# Patient Record
Sex: FEMALE | Race: WHITE | NOT HISPANIC OR LATINO | Employment: UNEMPLOYED | URBAN - METROPOLITAN AREA
[De-identification: names, ages, dates, MRNs, and addresses within clinical notes are randomized per-mention and may not be internally consistent; named-entity substitution may affect disease eponyms.]

---

## 2017-03-27 ENCOUNTER — TRANSFERRED RECORDS (OUTPATIENT)
Dept: HEALTH INFORMATION MANAGEMENT | Facility: CLINIC | Age: 37
End: 2017-03-27

## 2018-09-06 ENCOUNTER — HOSPITAL ENCOUNTER (OUTPATIENT)
Dept: PHYSICAL THERAPY | Facility: CLINIC | Age: 38
Setting detail: THERAPIES SERIES
End: 2018-09-06
Payer: COMMERCIAL

## 2018-09-06 PROCEDURE — 97110 THERAPEUTIC EXERCISES: CPT | Mod: GP | Performed by: PHYSICAL THERAPIST

## 2018-09-06 PROCEDURE — 97140 MANUAL THERAPY 1/> REGIONS: CPT | Mod: GP | Performed by: PHYSICAL THERAPIST

## 2018-09-06 PROCEDURE — 97161 PT EVAL LOW COMPLEX 20 MIN: CPT | Mod: GP | Performed by: PHYSICAL THERAPIST

## 2018-09-06 PROCEDURE — 40000099 ZZH STATISTIC LYMPHEDEMA VISIT: Performed by: PHYSICAL THERAPIST

## 2018-09-06 NOTE — PROGRESS NOTES
"Outpatient Lymphedema Therapy Evaluation     09/06/18 0900   Rehab Discipline   Discipline PT   Type of Visit   Type of visit Initial Edema Evaluation       present No   General Information   Start of care 09/06/18   Referring physician Dr. Vi Bennett MD ([facility])   Orders and Order date Evaluate and treat as indicated (order date : 8/23/18)     Medical diagnosis s/p R breast mastectomy 12/13/12; s/p gastric bypass 8/10/15   Onset of illness / date of surgery 08/10/15   Edema onset 01/20/13   Affected body parts Trunk;RUE   Edema etiology Cancer with lymph node dissection;Radiation;Chemo;Surgery;Infection   Location - Cancer with lymph node dissection 10 LN removed R axillary, paul mastectomy L preventative, 2 + LN R   Location - Radiation completed 3/29/13 R breast axilla down to ribs   Radiation comments completed 4/2013   Chemotherapy comments completed 4/2013   Edema etiology comments recent fat grafting performed in the R-reconstructed breast ~8 months ago; recent palpable lump at R-breast; had US on 7/26/18 - per pt report lump is not cancerous and is \"fat necrosis\" from grafting and reports will go back in October to [place] for either draining or removal   Pertinent history of current problem (PT: include personal factors and/or comorbidities that impact the POC; OT: include additional occupational profile info) last seen at this OP lymphedema clinic from 12/17/15 - 2/16/16 and was using a 20-30mmHg compression sleeve from [facility] (over the counter) at discharge, also has a Flexitouch compression pump; 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; Flexitouch pump as needed   Surgical / medical history reviewed Yes   Edema special tests Ultrasound  (on 7/26/18 for palpable lump at R-breast)   Prior level of functional mobility independent with funcitonal mobility and ADL " with tightness noted in R-axilla with far end ROM (flexion primarily)   Prior treatment Complete decongestive therapy;Compression garments;Exercise;Compression pump;Elevation;MLD;Gradient compression bandaging  (also has Tribute for nighttime)   Patient role / employment history Homemaker   [facility]   Living environment comments  and two boys   Assistive device comments none   Fall Risk Screen   Fall screen completed by PT   Have you fallen 2 or more times in the past year? No   Have you fallen and had an injury in the past year? No   Is patient a fall risk? No   System Outcome Measures   Outcome Measures Lymphedema   Lymphedema Life Impact Scale (score range 0-72). A higher score indicates greater impairment. 10   Subjective Report   Patient report of symptoms arm is heavy; R-breast can get tight along incision line and where fat necrosis pad is and tightness felt with end range overhead activities   Precautions   Precautions comments no new precautions   Patient / Family Goals   Patient / family goals statement to control and maintain edema   Pain   Patient currently in pain No   Vitals Signs   Heart Rate 89   SpO2 98   Cognitive Status   Orientation Orientation to person, place and time   Level of consciousness Alert   Follows commands and answers questions 100% of the time   Personal safety and judgement Intact   Memory Intact   Edema Exam / Assessment   Skin condition Intact   Skin condition comments RUE skin all intact, R-upper arm is barrera/thicker upon palpation vs L-upper arm; R-axilla with noteable pucking and tightness but no palpable cord present   Scar Yes   Location B breast mastectomy scars with noteable small edematous filled buldge mid-anteiror breast from fat necrosis   Mobility fairly good   Scar comments closed/healed   Capillary refill Symmetrical   Radial pulse Symmetrical   Stemmer sign Negative   Ulceration No   Girth Measurements   Girth Measurements Refer  to separate girth measurement flowsheet   Volume UE   Right UE (mL) 2484.82  (+28.7% since last seen 2/16/16 (pt with 50# wt gain))   Left UE (mL) 2203.71  (+19.6% since last seen 2/16/16 (pt with 50# wt gain))   UE volume comparison RUE volume greater than LUE volume   % difference 11.0%   Range of Motion   ROM comments RUE GH flex, GH abduction and IR/ER WFL; reported tightneww at end range with visible pucking in R-axilla with extreme R-GH flex   Strength   Strength comments functional RUE strength   Posture   Posture Normal   Palpation   Palpation denies hypersensitivities   Sensory   Sensory perception Light touch   Light touch Impaired   Sensory perception comments absent to decreased sensation in R-axilla and R-breast   Vascular Assessment   Vascular Assessment Comments no known concerns   Coordination   Coordination Gross motor coordination appropriate   Muscle Tone   Muscle tone No deficits were identified   Planned Edema Interventions   Planned edema interventions Manual lymph drainage;Gradient compression bandaging;Fit for compression garment;Exercises;Precautions to prevent infection / exacerbation;Education;Manual therapy;Skin care / precautions;Myofascial release;Home management program development   Clinical Impression   Criteria for skilled therapeutic intervention met Yes   Therapy diagnosis RUE lymphedema and R-axillary tightness   Influenced by the following impairments / conditions Stage 2   Functional limitations due to impairments / conditions 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; occasional discomfort when sleeping   Clinical Presentation Evolving/Changing   Clinical Presentation Rationale clinical judgement; RUE with 28.7% increase over past 3 years; new/increased tightness in R-axilla   Clinical Decision Making (Complexity) Low complexity   Treatment frequency 1 time / week   Treatment duration 12 weeks   Patient / family and/or staff in agreement with plan of care Yes   Risks and  benefits of therapy have been explained Yes   Education Assessment   Preferred learning style Listening   Barriers to learning No barriers   Goals   Edema Eval Goals 1;2;3   Goal 1   Goal identifier stg   Goal description pt to have nighttime tolerance to RUE GCB for edema reduction response   Target date 09/20/18   Goal 2   Goal identifier ltg   Goal description once appropriate ,pt to be independent with donning, doffing and care of compression sleeve for longterm RUE lymphedema management for maintenance   Target date 12/05/18   Goal 3   Goal identifier ltg   Goal description pt to be independent with RUE lymphedema management longterm via stretching, skin cares, compression garment wear and compresion pump use   Target date 12/05/18   Goal 4   Goal identifier ltg   Goal description pt to have at least 2 point improvement on LLIS due to decreased lymphedema and associated symptoms in RUE   Target date 12/05/18   Total Evaluation Time   Total evaluation time 10

## 2018-09-13 ENCOUNTER — HOSPITAL ENCOUNTER (OUTPATIENT)
Dept: PHYSICAL THERAPY | Facility: CLINIC | Age: 38
Setting detail: THERAPIES SERIES
End: 2018-09-13
Attending: FAMILY MEDICINE
Payer: COMMERCIAL

## 2018-09-13 PROCEDURE — 97140 MANUAL THERAPY 1/> REGIONS: CPT | Mod: GP | Performed by: PHYSICAL THERAPIST

## 2018-09-13 PROCEDURE — 40000099 ZZH STATISTIC LYMPHEDEMA VISIT: Performed by: PHYSICAL THERAPIST

## 2018-09-20 ENCOUNTER — HOSPITAL ENCOUNTER (OUTPATIENT)
Dept: PHYSICAL THERAPY | Facility: CLINIC | Age: 38
Setting detail: THERAPIES SERIES
End: 2018-09-20
Payer: COMMERCIAL

## 2018-09-20 PROCEDURE — 40000099 ZZH STATISTIC LYMPHEDEMA VISIT: Performed by: REHABILITATION PRACTITIONER

## 2018-09-20 PROCEDURE — 97140 MANUAL THERAPY 1/> REGIONS: CPT | Mod: GP | Performed by: REHABILITATION PRACTITIONER

## 2018-09-27 ENCOUNTER — HOSPITAL ENCOUNTER (OUTPATIENT)
Dept: PHYSICAL THERAPY | Facility: CLINIC | Age: 38
Setting detail: THERAPIES SERIES
End: 2018-09-27
Payer: COMMERCIAL

## 2018-09-27 PROCEDURE — 40000099 ZZH STATISTIC LYMPHEDEMA VISIT: Performed by: REHABILITATION PRACTITIONER

## 2018-09-27 PROCEDURE — 97140 MANUAL THERAPY 1/> REGIONS: CPT | Mod: GP | Performed by: REHABILITATION PRACTITIONER

## 2018-10-10 NOTE — PROGRESS NOTES
Outpatient Physical Therapy Progress Note     Patient: Princess Chang  : 1980    Beginning/End Dates of Reporting Period:  2018 to 10/6/2018    Referring Provider: Dr. Vi Bennett MD    Therapy Diagnosis: RUE lymphedema with chest and axillary tightness     Client Self Report: feels the stretch is really helping, wearing compression sleeve daily    Objective Measurements:  Objective Measure: girth  Details: R UE -3.9%       Outcome Measures (most recent score):   LLIS  = 10 on date of initial evaluation; pt will again complete LLIS at time of discharge     Goals:  Goal Identifier stg   Goal Description pt to have nighttime tolerance to RUE GCB for edema reduction response   Target Date 18   Date Met  18   Progress:     Goal Identifier ltg   Goal Description once appropriate ,pt to be independent with donning, doffing and care of compression sleeve for longterm RUE lymphedema management for maintenance   Target Date 18   Date Met      Progress:     Goal Identifier ltg   Goal Description pt to be independent with RUE lymphedema management longterm via stretching, skin cares, compression garment wear and compresion pump use   Target Date 18   Date Met      Progress:     Goal Identifier ltg   Goal Description pt to have at least 2 point improvement on LLIS due to decreased lymphedema and associated symptoms in RUE   Target Date 18   Date Met      Progress:       Progress Toward Goals:   Patient is making good progress toward goals in regards to LUE lymphedema girth/volume reductions as well as reported decreased tightness; will continue with weekly treatments until LUE girth plateaus and then will get patient fitted for a new compression sleeve for maintenance for longterm management      Plan:  Continue therapy per current plan of care.    Discharge:  No

## 2018-10-10 NOTE — ADDENDUM NOTE
Encounter addended by: Roseline Gtz, PT on: 10/10/2018  9:47 AM<BR>     Actions taken: Flowsheet accepted, Sign clinical note

## 2018-10-16 ENCOUNTER — HOSPITAL ENCOUNTER (OUTPATIENT)
Dept: PHYSICAL THERAPY | Facility: CLINIC | Age: 38
Setting detail: THERAPIES SERIES
End: 2018-10-16
Payer: COMMERCIAL

## 2018-10-16 PROCEDURE — 97140 MANUAL THERAPY 1/> REGIONS: CPT | Mod: GP | Performed by: PHYSICAL THERAPIST

## 2018-10-16 PROCEDURE — 40000099 ZZH STATISTIC LYMPHEDEMA VISIT: Performed by: PHYSICAL THERAPIST

## 2018-11-07 NOTE — ADDENDUM NOTE
Encounter addended by: Roseline Gtz, PT on: 11/7/2018  9:38 AM<BR>     Actions taken: Sign clinical note, Flowsheet accepted

## 2018-11-07 NOTE — PROGRESS NOTES
Outpatient Physical Therapy Progress Note     Patient: Princess Chang  : 1980    Beginning/End Dates of Reporting Period:  10/6/2018 to 2018    Referring Provider: Dr. Vi Bennett MD    Therapy Diagnosis: RUE lymphedema and breast lymphedema, chronic ; with related scarring/tightening     Client Self Report: all the biopsies I had came back beneign     Objective Measurements:  Objective Measure: girth  Details: RUE -8.2% since last session on 18      Outcome Measures (most recent score):   LLIS = 10 on date of initial evaluation; will again complete at time of discharge     Goals:  Goal Identifier stg   Goal Description pt to have nighttime tolerance to RUE GCB for edema reduction response   Target Date 18   Date Met  18   Progress:     Goal Identifier ltg   Goal Description once appropriate ,pt to be independent with donning, doffing and care of compression sleeve for longterm RUE lymphedema management for maintenance   Target Date 18   Date Met      Progress:     Goal Identifier ltg   Goal Description pt to be independent with RUE lymphedema management longterm via stretching, skin cares, compression garment wear and compresion pump use   Target Date 18   Date Met      Progress:     Goal Identifier ltg   Goal Description pt to have at least 2 point improvement on LLIS due to decreased lymphedema and associated symptoms in RUE   Target Date 18   Date Met      Progress:       Progress Toward Goals:   Good reductions in RUE girth/volume with CDT. Will continue with CDT until plateau achieved and then fit pt for a new compression sleeve, possibly custom pending measurements, for longterm management for maintenance. Pt reports also needing a new nighttime Tribute.       Plan:  Continue therapy per current plan of care.    Discharge:  No

## 2018-11-26 ENCOUNTER — COMMUNICATION - HEALTHEAST (OUTPATIENT)
Dept: SURGERY | Facility: CLINIC | Age: 38
End: 2018-11-26

## 2018-11-28 ENCOUNTER — HOSPITAL ENCOUNTER (OUTPATIENT)
Dept: PHYSICAL THERAPY | Facility: CLINIC | Age: 38
Setting detail: THERAPIES SERIES
End: 2018-11-28
Payer: COMMERCIAL

## 2018-11-28 DIAGNOSIS — I89.0 LYMPHEDEMA OF RIGHT UPPER EXTREMITY: ICD-10-CM

## 2018-11-28 DIAGNOSIS — I89.0 LYMPHEDEMA: Primary | ICD-10-CM

## 2018-11-28 PROCEDURE — 97140 MANUAL THERAPY 1/> REGIONS: CPT | Mod: GP | Performed by: PHYSICAL THERAPIST

## 2018-11-28 PROCEDURE — 40000099 ZZH STATISTIC LYMPHEDEMA VISIT: Performed by: PHYSICAL THERAPIST

## 2018-12-13 NOTE — ADDENDUM NOTE
Encounter addended by: Roseline Gtz, PT on: 12/13/2018 10:57 AM   Actions taken: Sign clinical note, Flowsheet accepted

## 2018-12-13 NOTE — PROGRESS NOTES
Outpatient Physical Therapy Progress Note     Patient: Princess Chang  : 1980    Beginning/End Dates of Reporting Period:  2018 to 2018    Referring Provider: Dr. Vi Bennett    Therapy Diagnosis: RUE and R-chest lymphedema      Client Self Report: my pump isn't fitting right, I need to know what to do to get it fixed    Objective Measurements:  Objective Measure: girth  Details: RUE +3.1% since 10/16/18 but -2.0% since initial evaluation      Outcome Measures (most recent score):   LLIS = 10 on date of initial evaluation; pt will again complete LLIS at time of discharge     Goals:  Goal Identifier stg   Goal Description pt to have nighttime tolerance to RUE GCB for edema reduction response   Target Date 18   Date Met  18   Progress:     Goal Identifier ltg   Goal Description once appropriate ,pt to be independent with donning, doffing and care of compression sleeve for longterm RUE lymphedema management for maintenance   Target Date 18   Date Met      Progress:     Goal Identifier ltg   Goal Description pt to be independent with RUE lymphedema management longterm via stretching, skin cares, compression garment wear and compresion pump use   Target Date 18   Date Met      Progress:     Goal Identifier ltg   Goal Description pt to have at least 2 point improvement on LLIS due to decreased lymphedema and associated symptoms in RUE   Target Date 18   Date Met      Progress:     Progress Toward Goals:   Good progress has been made toward goals. Arm has reduced and pt has no complaints (S&S of lymphedema) or deficits of RUE and was given 'go ahead' to go back to [facility] to get re-fit for custom compression sleeve and a new nighttime garment. Pt also has made appt with [facility] to get her Flexitouch pump re-fit. Pt instructed to return to lymphedema clinic after receiving her new garments and wearing for ~1-2 weeks to ensure new garments and home program are adequately  containing lymphedema and maintenance.  Anticipate next visit to be discharge.    Plan:  Continue therapy per current plan of care.    Discharge:  No

## 2019-01-03 NOTE — ADDENDUM NOTE
Encounter addended by: Roseline Gtz, PT on: 1/3/2019 2:23 PM   Actions taken: Sign clinical note, Flowsheet accepted

## 2019-01-03 NOTE — PROGRESS NOTES
Outpatient Physical Therapy Progress Note     Patient: Princess Chang  : 1980    Beginning/End Dates of Reporting Period:  2018 to 1/3/2019    Referring Provider: Dr. Dalton MD    Therapy Diagnosis: RUE lymphedema      Client Self Report: my pump isn't fitting right, I need to know what to do to get it fixed    Objective Measurements:  Objective Measure: girth  Details: RUE +3.1% since 10/16/18; since initial evaluation RUE -2.0%     Outcome Measures (most recent score):   LLIS = 10 on date of initial evaluation; pt will again complete LLIS at time of discharge     Goals:  Goal Identifier stg   Goal Description pt to have nighttime tolerance to RUE GCB for edema reduction response   Target Date 18   Date Met  18   Progress:     Goal Identifier ltg   Goal Description once appropriate ,pt to be independent with donning, doffing and care of compression sleeve for longterm RUE lymphedema management for maintenance   Target Date 19   Date Met      Progress:     Goal Identifier ltg   Goal Description pt to be independent with RUE lymphedema management longterm via stretching, skin cares, compression garment wear and compresion pump use   Target Date 19   Date Met      Progress:     Goal Identifier ltg   Goal Description pt to have at least 2 point improvement on LLIS due to decreased lymphedema and associated symptoms in RUE   Target Date 19   Date Met      Progress:     Progress Toward Goals:   Good progress made toward goals. Patient was last seen on 18 and at that time was instructed to return to [facility] to get fit for new daytime custom compression sleeve and nighttime garments as well as call [facility] to get new piece for compression pump and then instructed to return to OP lymphedema clinic after all three completed and has been wearing for 1-2 weeks to ensure that new garments and pump are adequately containing lymphedema for longterm management  for maintenance.  Spoke with patient on 1/3/19 and pt reports is still waiting for daytime sleeve as well as waiting to get new piece for pump - pt reports she's doing well at this time and will return sooner if needed.      Plan:  Continue therapy per current plan of care.    Discharge:  No

## 2019-02-05 NOTE — ADDENDUM NOTE
Encounter addended by: Roseline Gtz, PT on: 2/5/2019 1:00 PM   Actions taken: Sign clinical note, Flowsheet accepted

## 2019-02-05 NOTE — PROGRESS NOTES
Outpatient Physical Therapy Progress Note     Patient: Princess Chang  : 1980    Beginning/End Dates of Reporting Period:  1/3/2019 to 19    Referring Provider: Dr. Dalton MD    Therapy Diagnosis: RUE lymphedema      Client Self Report: my pump isn't fitting right, I need to know what to do to get it fixed    Objective Measurements:  Objective Measure: girth  Details: RUE +3.1% since 10/16/18; since initial evaluation RUE -2.0%     Outcome Measures (most recent score):   LLIS = 10 on date of initial evaluation; pt will again complete LLIS at time of discharge     Goals:  Goal Identifier stg   Goal Description pt to have nighttime tolerance to RUE GCB for edema reduction response   Target Date 18   Date Met  18   Progress:     Goal Identifier ltg   Goal Description once appropriate ,pt to be independent with donning, doffing and care of compression sleeve for longterm RUE lymphedema management for maintenance   Target Date 19   Date Met      Progress:     Goal Identifier ltg   Goal Description pt to be independent with RUE lymphedema management longterm via stretching, skin cares, compression garment wear and compresion pump use   Target Date 19   Date Met      Progress:     Goal Identifier ltg   Goal Description pt to have at least 2 point improvement on LLIS due to decreased lymphedema and associated symptoms in RUE   Target Date 19   Date Met      Progress:     Progress Toward Goals:   Patient was last seen on 18 and at that time was instructed to return to [facility] to get fit for new daytime custom compression sleeve and nighttime garments as well as call [facility] to get new piece for compression pump and then instructed to return to OP lymphedema clinic after all three completed and has been wearing for 1-2 weeks to ensure that new garments and pump are adequately containing lymphedema for longterm management for maintenance.  Spoke with patient  on 1/3/19 and pt reports is still waiting for daytime sleeve as well as waiting to get new piece for pump - pt reports she's doing well at this time and will return sooner if needed.    As of 2/2/19: patient still waiting for sleeve      Plan:  Continue therapy per current plan of care.    Discharge:  No

## 2019-02-12 ENCOUNTER — HOSPITAL ENCOUNTER (OUTPATIENT)
Dept: PHYSICAL THERAPY | Facility: CLINIC | Age: 39
Setting detail: THERAPIES SERIES
End: 2019-02-12
Payer: COMMERCIAL

## 2019-02-12 PROCEDURE — 97140 MANUAL THERAPY 1/> REGIONS: CPT | Mod: GP | Performed by: PHYSICAL THERAPIST

## 2019-02-13 ENCOUNTER — AMBULATORY - HEALTHEAST (OUTPATIENT)
Dept: SURGERY | Facility: CLINIC | Age: 39
End: 2019-02-13

## 2019-02-13 DIAGNOSIS — K91.2 POSTSURGICAL MALABSORPTION: ICD-10-CM

## 2019-02-13 DIAGNOSIS — Z98.84 S/P BARIATRIC SURGERY: ICD-10-CM

## 2019-02-13 DIAGNOSIS — K90.9 INTESTINAL MALABSORPTION, UNSPECIFIED: ICD-10-CM

## 2019-03-05 ENCOUNTER — OFFICE VISIT - HEALTHEAST (OUTPATIENT)
Dept: SURGERY | Facility: CLINIC | Age: 39
End: 2019-03-05

## 2019-03-05 DIAGNOSIS — E66.01 MORBID OBESITY (H): ICD-10-CM

## 2019-03-05 DIAGNOSIS — E55.9 VITAMIN D DEFICIENCY: ICD-10-CM

## 2019-03-05 DIAGNOSIS — E21.1 HYPERPARATHYROIDISM , SECONDARY, NON-RENAL (H): ICD-10-CM

## 2019-03-05 DIAGNOSIS — K91.2 POSTOPERATIVE MALABSORPTION: ICD-10-CM

## 2019-03-05 ASSESSMENT — MIFFLIN-ST. JEOR: SCORE: 1865.44

## 2019-03-07 ENCOUNTER — HOSPITAL ENCOUNTER (OUTPATIENT)
Dept: PHYSICAL THERAPY | Facility: CLINIC | Age: 39
Setting detail: THERAPIES SERIES
End: 2019-03-07
Payer: COMMERCIAL

## 2019-03-07 PROCEDURE — 97140 MANUAL THERAPY 1/> REGIONS: CPT | Mod: GP | Performed by: PHYSICAL THERAPIST

## 2019-03-14 NOTE — ADDENDUM NOTE
Encounter addended by: Roseline Gtz, PT on: 3/14/2019 8:57 AM   Actions taken: Flowsheet accepted, Sign clinical note

## 2019-03-14 NOTE — PROGRESS NOTES
Outpatient Physical Therapy Progress Note     Patient: Princess Chang  : 1980    Beginning/End Dates of Reporting Period:   2019 to 3/4/2019    Referring Provider: Dr. Vi Bennett MD    Therapy Diagnosis: RUE and R-chest/breast lymphedema, chronic      Client Self Report: I'm going to be getting more fat put into the R-breast in April    Objective Measurements:  Objective Measure: girth  Details: RUE -2.9% since last session on 19     Outcome Measures (most recent score):   LLIS = 10 on date of initial evaluation; pt will again complete LLIS at time of discharge     Goals:  Goal Identifier stg   Goal Description pt to have nighttime tolerance to RUE GCB for edema reduction response   Target Date 18   Date Met  18   Progress:     Goal Identifier ltg   Goal Description once appropriate ,pt to be independent with donning, doffing and care of compression sleeve for longterm RUE lymphedema management for maintenance   Target Date 19   Date Met  19   Progress:     Goal Identifier ltg   Goal Description pt to be independent with RUE lymphedema management longterm via stretching, skin cares, compression garment wear and compresion pump use   Target Date 19   Date Met      Progress:     Goal Identifier ltg   Goal Description pt to have at least 2 point improvement on LLIS due to decreased lymphedema and associated symptoms in RUE   Target Date 19   Date Met      Progress:     Progress Toward Goals:   Patient has made good progress toward goals with RUE lymphedema reduction response with adjusting her daytime and nighttime home program.  Pt will be getting additional fat placement in R-breast ~mid-April and instructed to return for check-in with lymphedema clinic just before and then again after that surgery/procedure, always sooner if needed.       Plan:  Continue therapy per current plan of care.    Discharge:  No

## 2019-04-03 NOTE — PROGRESS NOTES
Outpatient Physical Therapy Progress Note     Patient: Princess Chang  : 1980    Beginning/End Dates of Reporting Period:  3/4/2019 to 4/3/2019    Referring Provider: Dr. Vi Bennett MD    Therapy Diagnosis: RUE lymphedema      Client Self Report: I'm going to be getting more fat put into the R-breast in April by my plastic surgeon    Objective Measurements:  Objective Measure: girth  Details: RUE -2.9% since last session on 19      Outcome Measures (most recent score):   LLIS = 10 on date of initial evaluation; pt will again complete LLIS at time of discharge     Goals:  Goal Identifier stg   Goal Description pt to have nighttime tolerance to RUE GCB for edema reduction response   Target Date 18   Date Met  18   Progress:     Goal Identifier ltg   Goal Description once appropriate ,pt to be independent with donning, doffing and care of compression sleeve for longterm RUE lymphedema management for maintenance   Target Date 19   Date Met  19   Progress:     Goal Identifier ltg   Goal Description pt to be independent with RUE lymphedema management longterm via stretching, skin cares, compression garment wear and compresion pump use   Target Date 19   Date Met      Progress:     Goal Identifier ltg   Goal Description pt to have at least 2 point improvement on LLIS due to decreased lymphedema and associated symptoms in RUE   Target Date 19   Date Met      Progress:       Progress Toward Goals:   Patient last seen on 3/7/19 and at that time had made good progress in regards to RUE lymphedema reductions with modifying her home program.  Pt last seen on 3/7/19 and instructed pt to return in ~3 weeks to see if additional reductions can be achieved prior to surgery.    Plan:  Continue therapy per current plan of care.    Discharge:  No

## 2019-04-03 NOTE — ADDENDUM NOTE
Encounter addended by: Roseline Gtz, PT on: 4/3/2019 12:56 PM   Actions taken: Flowsheet accepted, Sign clinical note

## 2019-05-09 ENCOUNTER — HOSPITAL ENCOUNTER (OUTPATIENT)
Dept: PHYSICAL THERAPY | Facility: CLINIC | Age: 39
Setting detail: THERAPIES SERIES
End: 2019-05-09
Payer: COMMERCIAL

## 2019-05-09 PROCEDURE — 97140 MANUAL THERAPY 1/> REGIONS: CPT | Mod: GP | Performed by: PHYSICAL THERAPIST

## 2019-05-09 NOTE — ADDENDUM NOTE
Encounter addended by: Roseline Gtz, PT on: 5/9/2019 8:40 AM   Actions taken: Sign clinical note, Flowsheet accepted

## 2019-05-09 NOTE — PROGRESS NOTES
Outpatient Physical Therapy Progress Note     Patient: Princess Chang  : 1980    Beginning/End Dates of Reporting Period:  4/3/2019 - 5/3/19    Referring Provider: Dr. Vi Bennett MD    Therapy Diagnosis: RUE lymphedema      Client Self Report: I'm going to be getting more fat put into the R-breast in April by my plastic surgeon    Objective Measurements:  Objective Measure: girth  Details: RUE -2.9% since last session on 19      Outcome Measures (most recent score):   LLIS = 10 on date of initial evaluation; pt will again complete LLIS at time of discharge     Goals:  Goal Identifier stg   Goal Description pt to have nighttime tolerance to RUE GCB for edema reduction response   Target Date 18   Date Met  18   Progress:     Goal Identifier ltg   Goal Description once appropriate ,pt to be independent with donning, doffing and care of compression sleeve for longterm RUE lymphedema management for maintenance   Target Date 19   Date Met  19   Progress:     Goal Identifier ltg   Goal Description pt to be independent with RUE lymphedema management longterm via stretching, skin cares, compression garment wear and compresion pump use   Target Date 19   Date Met      Progress:     Goal Identifier ltg   Goal Description pt to have at least 2 point improvement on LLIS due to decreased lymphedema and associated symptoms in RUE   Target Date 19   Date Met      Progress:       Progress Toward Goals:   Per last progress note:  Patient last seen on 3/7/19 and at that time had made good progress in regards to RUE lymphedema reductions with modifying her home program.  Pt last seen on 3/7/19 and instructed pt to return in ~3 weeks to see if additional reductions can be achieved prior to surgery.    This reporting period: patient not seen, was initially scheduled just prior to surgery but then developed cellulitis that needed to be addressed and controlled before surgery so called  therapist to talk on phone to update and reports she will return a few weeks after surgery which therapist agreed with plan. Pt scheduled to be seen 5/9/19    Plan:  Continue therapy per current plan of care.    Discharge:  No

## 2019-05-14 ENCOUNTER — HOSPITAL ENCOUNTER (OUTPATIENT)
Dept: PHYSICAL THERAPY | Facility: CLINIC | Age: 39
Setting detail: THERAPIES SERIES
End: 2019-05-14
Payer: COMMERCIAL

## 2019-05-14 PROCEDURE — 97140 MANUAL THERAPY 1/> REGIONS: CPT | Mod: GP | Performed by: REHABILITATION PRACTITIONER

## 2019-05-23 ENCOUNTER — HOSPITAL ENCOUNTER (OUTPATIENT)
Dept: PHYSICAL THERAPY | Facility: CLINIC | Age: 39
Setting detail: THERAPIES SERIES
End: 2019-05-23
Payer: COMMERCIAL

## 2019-05-23 PROCEDURE — 97140 MANUAL THERAPY 1/> REGIONS: CPT | Mod: GP | Performed by: PHYSICAL THERAPIST

## 2019-05-30 ENCOUNTER — HOSPITAL ENCOUNTER (OUTPATIENT)
Dept: PHYSICAL THERAPY | Facility: CLINIC | Age: 39
Setting detail: THERAPIES SERIES
End: 2019-05-30
Payer: COMMERCIAL

## 2019-05-30 PROCEDURE — 97140 MANUAL THERAPY 1/> REGIONS: CPT | Mod: GP | Performed by: PHYSICAL THERAPIST

## 2019-06-05 NOTE — PROGRESS NOTES
Outpatient Physical Therapy Progress Note     Patient: Princess Chang  : 1980    Beginning/End Dates of Reporting Period:  5/3/2019 to 6/3/2019    Referring Provider: Dr. Michelle Hoffman MD    Therapy Diagnosis: RUE/RUQ chronic lymphedema with scarring and fibrosis     Client Self Report: the scab seems to be getting smaller    Objective Measurements:   Objective Measure: girth (measured on 19)  Data: since last measured on 3/7/19: RUE +7.1%         Outcome Measures (most recent score):   LLIS = 10 on date of initial evaluation; pt will again complete LLIS at time of discharge     Goals:  Goal Identifier stg   Goal Description pt to have nighttime tolerance to RUE GCB for edema reduction response   Target Date 18   Date Met  18   Progress:     Goal Identifier ltg   Goal Description once appropriate ,pt to be independent with donning, doffing and care of compression sleeve for longterm RUE lymphedema management for maintenance   Target Date 19   Date Met  19   Progress:     Goal Identifier ltg   Goal Description pt to be independent with RUE lymphedema management longterm via stretching, skin cares, compression garment wear and compresion pump use   Target Date 19   Date Met      Progress:     Goal Identifier ltg   Goal Description pt to have at least 2 point improvement on LLIS due to decreased lymphedema and associated symptoms in RUE   Target Date 19   Date Met      Progress:       Progress Toward Goals:   When patient returned to OP lymphedema clinic after surgery on 19, the RUE girth/volume had increased (see above) and due to patient unable to use her Flexitouch compression pump (as unable to place/pump over R-chest/breast since surgery for healing purposes)  It was deemed appropriate to return to OP lymphedema clinic 2x/week for manual treatment via MLD.  Will continue to see patient 2x/week for manual treatment until large scab (which is being closely monitored  by surgeon on weekly basis) is no longer present and pt able to return to daily pump use.       Plan:  Continue therapy per current plan of care.    Discharge:  No

## 2019-06-05 NOTE — ADDENDUM NOTE
Encounter addended by: Roseline Gtz, PT on: 6/5/2019 2:51 PM   Actions taken: Sign clinical note, Flowsheet accepted

## 2019-06-06 ENCOUNTER — HOSPITAL ENCOUNTER (OUTPATIENT)
Dept: PHYSICAL THERAPY | Facility: CLINIC | Age: 39
Setting detail: THERAPIES SERIES
End: 2019-06-06
Payer: COMMERCIAL

## 2019-06-06 PROCEDURE — 97140 MANUAL THERAPY 1/> REGIONS: CPT | Mod: GP | Performed by: PHYSICAL THERAPIST

## 2019-06-20 ENCOUNTER — HOSPITAL ENCOUNTER (OUTPATIENT)
Dept: PHYSICAL THERAPY | Facility: CLINIC | Age: 39
Setting detail: THERAPIES SERIES
End: 2019-06-20
Payer: COMMERCIAL

## 2019-06-20 PROCEDURE — 97140 MANUAL THERAPY 1/> REGIONS: CPT | Mod: GP | Performed by: REHABILITATION PRACTITIONER

## 2019-07-01 ENCOUNTER — HOSPITAL ENCOUNTER (OUTPATIENT)
Dept: PHYSICAL THERAPY | Facility: CLINIC | Age: 39
Setting detail: THERAPIES SERIES
End: 2019-07-01
Payer: COMMERCIAL

## 2019-07-01 PROCEDURE — 97140 MANUAL THERAPY 1/> REGIONS: CPT | Mod: GP | Performed by: REHABILITATION PRACTITIONER

## 2019-09-11 ENCOUNTER — HOSPITAL ENCOUNTER (OUTPATIENT)
Dept: PHYSICAL THERAPY | Facility: CLINIC | Age: 39
Setting detail: THERAPIES SERIES
End: 2019-09-11
Payer: COMMERCIAL

## 2019-09-11 PROCEDURE — 97140 MANUAL THERAPY 1/> REGIONS: CPT | Mod: GP | Performed by: PHYSICAL THERAPIST

## 2019-09-11 NOTE — PROGRESS NOTES
Outpatient Physical Therapy Progress Note     Patient: Princess Chang  : 1980    Beginning/End Dates of Reporting Period:  8/3/2019 to 2019    Referring Provider: Dr. Dalton MD    Therapy Diagnosis: RUE lymphedema      Client Self Report: the wound is all healed, just a little scab is left (referring to R-breast wound)    Objective Measurements:  Objective Measure: girth  Details: RUE + 0.1% since last measured on 19    Objective Measure: skin (right)  Details: fullness/thickness felt at lateral forearm just distal to cubiral fossa and R-posterior shoulder and upper back/scapular region; all non-pitting firm/full edema     Outcome Measures (most recent score):   LLIS = 10 on date of initial evaluation; pt will again complete LLIS at time of discharge     Goals:  Goal Identifier stg   Goal Description pt to have nighttime tolerance to RUE GCB for edema reduction response   Target Date 18   Date Met  18   Progress:     Goal Identifier ltg   Goal Description once appropriate ,pt to be independent with donning, doffing and care of compression sleeve for longterm RUE lymphedema management for maintenance   Target Date 19   Date Met  19   Progress:     Goal Identifier ltg   Goal Description pt to be independent with RUE lymphedema management longterm via stretching, skin cares, compression garment wear and compresion pump use   Target Date 12/10/19   Date Met      Progress:     Goal Identifier ltg   Goal Description pt to have at least 2 point improvement on LLIS due to decreased lymphedema and associated symptoms in RUE   Target Date 12/10/19   Date Met      Progress:       Progress Toward Goals:   Patient making good progress, able to again return to weekly appointments with goal of reducing lymphedema and associated thickness and firmness. Pt's R-breast wound is now healed and was just instructed today, 19, to return to daily home compression pump use.       Plan:  Continue  therapy per current plan of care.    Discharge:  No

## 2019-09-12 NOTE — PROGRESS NOTES
Outpatient Physical Therapy Progress Note     Patient: Princess Chang  : 1980    Beginning/End Dates of Reporting Period:  6/3/19 to 8/3/2019    Referring Provider: Dr. Dalton MD    Therapy Diagnosis: RUE lymphedema     Client Self Report: the tape works the best can remove to look at wound and it will stick right in place again, showed my surgeon the products and wrote them down, would like to know where to order more    Objective Measurements:  since 19 RUE -3.2% taken on 19    Outcome Measures (most recent score):   LLIS = 10 on date of initial evaluation; pt will again complete LLIS at time of discharge      Goals:  Goal Identifier stg   Goal Description pt to have nighttime tolerance to RUE GCB for edema reduction response   Target Date 18   Date Met  18   Progress:     Goal Identifier ltg   Goal Description once appropriate ,pt to be independent with donning, doffing and care of compression sleeve for longterm RUE lymphedema management for maintenance   Target Date 19   Date Met  19   Progress:     Goal Identifier ltg   Goal Description pt to be independent with RUE lymphedema management longterm via stretching, skin cares, compression garment wear and compresion pump use   Target Date 19   Date Met      Progress:     Goal Identifier ltg   Goal Description pt to have at least 2 point improvement on LLIS due to decreased lymphedema and associated symptoms in RUE   Target Date 19   Date Met      Progress:     Progress Toward Goals:   Progress this reporting period: Patient demonstrates understanding with wound care and use of wound care products.  Patient is to continue with attending lymphedema clinic to participate in MLD 2 x a week and is to continue to hold on Flexitouch until large scab is no longer present.  Patient was unable to f/u from 19 to 19 due to  issues and is to return to clinic once able.    Plan:  Continue therapy per  current plan of care.    Discharge:  No

## 2019-09-18 ENCOUNTER — HOSPITAL ENCOUNTER (OUTPATIENT)
Dept: PHYSICAL THERAPY | Facility: CLINIC | Age: 39
Setting detail: THERAPIES SERIES
End: 2019-09-18
Payer: COMMERCIAL

## 2019-09-18 PROCEDURE — 97140 MANUAL THERAPY 1/> REGIONS: CPT | Mod: GP | Performed by: PHYSICAL THERAPIST

## 2019-10-08 ENCOUNTER — HOSPITAL ENCOUNTER (OUTPATIENT)
Dept: PHYSICAL THERAPY | Facility: CLINIC | Age: 39
Setting detail: THERAPIES SERIES
End: 2019-10-08
Payer: COMMERCIAL

## 2019-10-08 PROCEDURE — 97140 MANUAL THERAPY 1/> REGIONS: CPT | Mod: GP | Performed by: PHYSICAL THERAPIST

## 2019-10-15 ENCOUNTER — HOSPITAL ENCOUNTER (OUTPATIENT)
Dept: PHYSICAL THERAPY | Facility: CLINIC | Age: 39
Setting detail: THERAPIES SERIES
End: 2019-10-15
Payer: COMMERCIAL

## 2019-10-15 PROCEDURE — 97140 MANUAL THERAPY 1/> REGIONS: CPT | Mod: GP | Performed by: PHYSICAL THERAPIST

## 2019-10-17 NOTE — PROGRESS NOTES
Outpatient Physical Therapy Progress Note     Patient: Princess Chang  : 1980    Beginning/End Dates of Reporting Period:  2019 to 10/11/2019    Referring Provider: Dr. Donald MD    Therapy Diagnosis: RUE and R-breast lymphedema      Client Self Report: I've been using 3 bandages at nighttime    Objective Measurements:  Objective Measure: girth  Details: since last measured on 19: RUE +1.9%     Outcome Measures (most recent score):   LLIS = 10 on date of initial evaluation; pt will again complete LLIS at time of discharge     Goals:  Goal Identifier stg   Goal Description pt to have nighttime tolerance to RUE GCB for edema reduction response   Target Date 18   Date Met  18   Progress:     Goal Identifier ltg   Goal Description once appropriate ,pt to be independent with donning, doffing and care of compression sleeve for longterm RUE lymphedema management for maintenance   Target Date 19   Date Met  19   Progress:     Goal Identifier ltg   Goal Description pt to be independent with RUE lymphedema management longterm via stretching, skin cares, compression garment wear and compresion pump use   Target Date 12/10/19   Date Met      Progress:     Goal Identifier ltg   Goal Description pt to have at least 2 point improvement on LLIS due to decreased lymphedema and associated symptoms in RUE   Target Date 12/10/19   Date Met      Progress:     Progress Toward Goals:   Patient's RUE lymphedema continues to fluctuate, but wound at R-breast is now 100% healed/closed. Ongoing 1x/week clinic appts for close arm monitoring, MLD and modifications of home program still required to help established optimal home program for long term management for maintenance.       Plan:  Continue therapy per current plan of care.    Discharge:  No

## 2019-10-25 ENCOUNTER — COMMUNICATION - HEALTHEAST (OUTPATIENT)
Dept: SURGERY | Facility: CLINIC | Age: 39
End: 2019-10-25

## 2019-10-29 ENCOUNTER — OFFICE VISIT - HEALTHEAST (OUTPATIENT)
Dept: SURGERY | Facility: CLINIC | Age: 39
End: 2019-10-29

## 2019-10-29 DIAGNOSIS — K91.2 POSTOPERATIVE MALABSORPTION: ICD-10-CM

## 2019-10-29 DIAGNOSIS — E66.01 MORBID OBESITY (H): ICD-10-CM

## 2019-10-29 ASSESSMENT — MIFFLIN-ST. JEOR: SCORE: 1860.9

## 2019-10-30 ENCOUNTER — HOSPITAL ENCOUNTER (OUTPATIENT)
Dept: PHYSICAL THERAPY | Facility: CLINIC | Age: 39
Setting detail: THERAPIES SERIES
End: 2019-10-30
Payer: COMMERCIAL

## 2019-10-30 PROCEDURE — 97140 MANUAL THERAPY 1/> REGIONS: CPT | Mod: GP | Performed by: PHYSICAL THERAPIST

## 2019-11-06 ENCOUNTER — HOSPITAL ENCOUNTER (OUTPATIENT)
Dept: PHYSICAL THERAPY | Facility: CLINIC | Age: 39
Setting detail: THERAPIES SERIES
End: 2019-11-06
Payer: COMMERCIAL

## 2019-11-06 PROCEDURE — 97140 MANUAL THERAPY 1/> REGIONS: CPT | Mod: GP | Performed by: REHABILITATION PRACTITIONER

## 2019-11-14 ENCOUNTER — HOSPITAL ENCOUNTER (OUTPATIENT)
Dept: PHYSICAL THERAPY | Facility: CLINIC | Age: 39
Setting detail: THERAPIES SERIES
End: 2019-11-14
Payer: COMMERCIAL

## 2019-11-14 PROCEDURE — 97140 MANUAL THERAPY 1/> REGIONS: CPT | Mod: GP | Performed by: PHYSICAL THERAPIST

## 2019-11-19 ENCOUNTER — HOSPITAL ENCOUNTER (OUTPATIENT)
Dept: PHYSICAL THERAPY | Facility: CLINIC | Age: 39
Setting detail: THERAPIES SERIES
End: 2019-11-19
Payer: COMMERCIAL

## 2019-11-19 PROCEDURE — 97140 MANUAL THERAPY 1/> REGIONS: CPT | Mod: GP | Performed by: REHABILITATION PRACTITIONER

## 2019-11-25 ENCOUNTER — HOSPITAL ENCOUNTER (OUTPATIENT)
Dept: PHYSICAL THERAPY | Facility: CLINIC | Age: 39
Setting detail: THERAPIES SERIES
End: 2019-11-25
Payer: COMMERCIAL

## 2019-11-25 PROCEDURE — 97140 MANUAL THERAPY 1/> REGIONS: CPT | Mod: GP | Performed by: PHYSICAL THERAPIST

## 2019-11-25 NOTE — PROGRESS NOTES
Outpatient Physical Therapy Progress Note     Patient: Princess Chang  : 1980    Beginning/End Dates of Reporting Period:  10/11/2019 to 11/10/2019    Referring Provider: Dr. Vi Bennett MD    Therapy Diagnosis: RUE lymphedema, chronic      Client Self Report:  I'm wondering when I should schedule an appt at [facility] for new garments     Objective Measurements:  Objective Measure: girth  Details: RUE -10.6%     Outcome Measures (most recent score):   LLIS = 10 on date of initial evaluation; pt will again complete LLIS at time of discharge     Goals:  Goal Identifier stg   Goal Description pt to have nighttime tolerance to RUE GCB for edema reduction response   Target Date 18   Date Met  18   Progress:     Goal Identifier ltg   Goal Description once appropriate ,pt to be independent with donning, doffing and care of compression sleeve for longterm RUE lymphedema management for maintenance   Target Date 19   Date Met  19   Progress:     Goal Identifier ltg   Goal Description pt to be independent with RUE lymphedema management longterm via stretching, skin cares, compression garment wear and compresion pump use   Target Date 12/10/19   Date Met      Progress:     Goal Identifier ltg   Goal Description pt to have at least 2 point improvement on LLIS due to decreased lymphedema and associated symptoms in RUE   Target Date 12/10/19   Date Met      Progress:     Progress Toward Goals:   Patient has made good progress toward goals and has achieved good lymphedema reductions in RUE and has been instructed to schedule an appointment at [facility] for new garments.  Pt will continue to benefit from 1x/week clinic appts for MLD and close monitoring of RUE to ensure reductions are maintained prior to garment fitting.       Plan:  Continue therapy per current plan of care.    Discharge:  No

## 2019-12-10 NOTE — PROGRESS NOTES
Outpatient Physical Therapy Progress Note     Patient: Princess Chang  : 1980    Beginning/End Dates of Reporting Period:  11/10/2019 to 12/10/2019    Referring Provider: Dr. Vi Bennett MD    Therapy Diagnosis: RUE lymphedema, chronic, with fibrosis and myofascial tightening      Client Self Report:  I'm going to be getting fit for my new garments soon    Objective Measurements:  Objective Measure: girth  Details: RUE -1.6% since last measured on      Outcome Measures (most recent score):   LLIS = 10 on date of initial evaluation; pt will again complete LLIS at time of discahrge    Goals:  Goal Identifier stg   Goal Description pt to have nighttime tolerance to RUE GCB for edema reduction response   Target Date 18   Date Met  18   Progress:     Goal Identifier ltg   Goal Description once appropriate ,pt to be independent with donning, doffing and care of compression sleeve for longterm RUE lymphedema management for maintenance   Target Date 19   Date Met  19   Progress:     Goal Identifier ltg   Goal Description pt to be independent with RUE lymphedema management longterm via stretching, skin cares, compression garment wear and compresion pump use   Target Date 2/10/20   Date Met      Progress:     Goal Identifier ltg   Goal Description pt to have at least 2 point improvement on LLIS due to decreased lymphedema and associated symptoms in RUE   Target Date 2/10/20   Date Met      Progress:       Progress Toward Goals:   Patient is making good progress toward goals with good lymphedema reductions in RUE and was sent back to [facility] for new custom garments (daytime and nighttime). Ongoing 1x/week treatment in clinic to closely monitor arm and for MLD to ensure arm lymphedema stays contained until new garments arrive. Pt also using compression pump daily.       Plan:  Continue therapy per current plan of care.    Discharge:  No

## 2020-01-08 ENCOUNTER — HOSPITAL ENCOUNTER (OUTPATIENT)
Dept: PHYSICAL THERAPY | Facility: CLINIC | Age: 40
Setting detail: THERAPIES SERIES
End: 2020-01-08
Payer: COMMERCIAL

## 2020-01-08 PROCEDURE — 97140 MANUAL THERAPY 1/> REGIONS: CPT | Mod: GP | Performed by: PHYSICAL THERAPIST

## 2020-01-27 NOTE — PROGRESS NOTES
Outpatient Physical Therapy Progress Note     Patient: Princess Chang  : 1980    Beginning/End Dates of Reporting Period:  12/10/2019 to 2020    Referring Provider: Dr. Vi Bennett MD    Therapy Diagnosis: RUE and RUQ lymphedema      Client Self Report: I put away all my [employer] decorations/boxes yesterday so I bet that's why my arm is up a little bit    Objective Measurements:  Objective Measure: girth  Details: from 19: RUE +3.1%     Outcome Measures (most recent score):   LLIS = 10 on date of initial evaluation; pt will again complete LLIS at time of discharge     Goals:  Goal Identifier stg   Goal Description pt to have nighttime tolerance to RUE GCB for edema reduction response   Target Date 18   Date Met  18   Progress:     Goal Identifier ltg   Goal Description once appropriate ,pt to be independent with donning, doffing and care of compression sleeve for longterm RUE lymphedema management for maintenance   Target Date 19   Date Met  19   Progress:     Goal Identifier ltg   Goal Description pt to be independent with RUE lymphedema management longterm via stretching, skin cares, compression garment wear and compresion pump use   Target Date 20   Date Met      Progress:     Goal Identifier ltg   Goal Description pt to have at least 2 point improvement on LLIS due to decreased lymphedema and associated symptoms in RUE   Target Date 20   Date Met      Progress:     Progress Toward Goals:   Good overall progress made this reporting period, pt has new garments (daytime sleeve and nighttime Tribute) and pt also continues to use pump daily; instructed pt to continue and f/u in 1 month, sooner if needed.      Plan:  Continue therapy per current plan of care.    Discharge:  No

## 2020-02-17 ENCOUNTER — HEALTH MAINTENANCE LETTER (OUTPATIENT)
Age: 40
End: 2020-02-17

## 2020-02-21 NOTE — PROGRESS NOTES
Outpatient Physical Therapy Discharge Note     Patient: Princess Chang  : 1980    Beginning/End Dates of Reporting Period:  2020 to 2020    Referring Provider: Dr. Vi Bennett MD    Therapy Diagnosis: RUE/RUQ lymphedema with myofascial tightening and significant scarring      Client Self Report: I put away all my [employer] decorations/boxes yesterday so I bet that's why my arm is up a little bit    Objective Measurements:  Objective Measure: girth  Details: from 19: RUE +3.1%     Outcome Measures (most recent score):  LLIS = 10 on date of initial evaluation; pt didn't return to clinic for final appt so unable to again complete LLIS     Goals:  Goal Identifier stg   Goal Description pt to have nighttime tolerance to RUE GCB for edema reduction response   Target Date 18   Date Met  18   Progress:     Goal Identifier ltg   Goal Description once appropriate ,pt to be independent with donning, doffing and care of compression sleeve for longterm RUE lymphedema management for maintenance   Target Date 19   Date Met  19   Progress:     Goal Identifier ltg   Goal Description pt to be independent with RUE lymphedema management longterm via stretching, skin cares, compression garment wear and compresion pump use   Target Date 20   Date Met      Progress:     Goal Identifier ltg   Goal Description pt to have at least 2 point improvement on LLIS due to decreased lymphedema and associated symptoms in RUE   Target Date 20   Date Met      Progress:       Progress Toward Goals:   Patient last seen in clinic on 2020 and at that time was managing RUE very well and had received all her new compression garments (see below for details) and instructed to f/u in 1 month for final check to ensure home program was adequately containing lymphedema for longterm management for maintenance. Pt has not scheduled a follow-up appt so will assume all is going well with home management and  will be discharged at this time.      Plan:  Discharge from therapy.    Discharge:    Reason for Discharge: Patient has failed to schedule further appointments.    Equipment Issued: [employer] Soft, CCL 2;  Tribute    Discharge Plan: Patient to continue home program.

## 2020-05-26 ENCOUNTER — AMBULATORY - HEALTHEAST (OUTPATIENT)
Dept: SURGERY | Facility: CLINIC | Age: 40
End: 2020-05-26

## 2020-05-26 ENCOUNTER — COMMUNICATION - HEALTHEAST (OUTPATIENT)
Dept: SURGERY | Facility: CLINIC | Age: 40
End: 2020-05-26

## 2020-05-26 DIAGNOSIS — E66.01 MORBID OBESITY (H): ICD-10-CM

## 2020-06-19 ENCOUNTER — AMBULATORY - HEALTHEAST (OUTPATIENT)
Dept: SURGERY | Facility: CLINIC | Age: 40
End: 2020-06-19

## 2020-06-19 DIAGNOSIS — E66.01 MORBID OBESITY (H): ICD-10-CM

## 2020-11-29 ENCOUNTER — HEALTH MAINTENANCE LETTER (OUTPATIENT)
Age: 40
End: 2020-11-29

## 2021-06-02 ENCOUNTER — RECORDS - HEALTHEAST (OUTPATIENT)
Dept: ADMINISTRATIVE | Facility: CLINIC | Age: 41
End: 2021-06-02

## 2021-06-02 VITALS — HEIGHT: 65 IN | WEIGHT: 264 LBS | BODY MASS INDEX: 43.99 KG/M2

## 2021-06-02 NOTE — PATIENT INSTRUCTIONS - HE
[facility]ariatric [facility]  Nutritional Guidelines  Gastric Bypass 18 Months Post Op and Beyond    General Guidelines and Helpful Hints:    Eat 3 meals per day + protein supplement(s). No snacks between meals.  o Do not skip meals.  This can cause overeating at the next meal and will prevent adequate protein and nutritional intake.    Aim for 60-80 grams of protein per day.  o Always eat your protein first. This assists with optimal nutrition and helps you stay full longer.  o Depending on your portion size, you may need to drink approved protein supplement between meals to achieve protein goals. Follow recommendations of your Dietitian.     Eat your protein first, and then follow with fiber.   o It is not necessary to count your fiber, but 15-20 grams per day is recommended.    o Add fiber by including fruits, vegetables, whole grains, and beans.     Portions should remain about 1 cup per meal. Use measuring cups to be accurate.    Continue to use saucer/salad plates, infant/toddler silverware to keep portion sizes small and take small bites.    Eat S-L-O-W-L-Y to make each meal last 20-30 minutes. Always stop eating when satisfied.    Continue to use caution with foods containing skins, peels or membranes. Chew well!    Aim for 64 oz. of calorie-free fluids daily.  o Continue to avoid caffeine and carbonation. If you choose to drink alcohol, do so in moderation.   o Remember to avoid drinking during meals, 15-30 minutes before and 30 minutes after.    Exercise is cano for continued weight loss and weight maintenance. Aim for 30-60 minutes of physical activity most days of the week. Include cardiovascular and strength training.    If having trouble tolerating meat, try using a crock-pot, tinfoil tent, steamer or other moist cooking method to create tender meats. Add broth or low-fat gravy to help meat stay moist.     Avoid high sugar and high fat foods to prevent dumping syndrome.  o Check nutrition labels for less  than 10 grams of sugar and less than 10 grams of fat per serving.    Continue Taking Vitamins/Minerals:  o 8098-4698 mcg of Sublingual B-12 daily  o 1 Multivitamin with Iron twice daily (chewable or swallow tabs)  o 500-600 mg Calcium Citrate twice daily (chewable or swallow tabs)  o 5000 IU Vitamin D3 daily    Sample Grocery List    Protein:    Fat free Greek or light yogurt (less than 10 grams sugar)    Fat free or low-fat cottage cheese    String cheese or reduced fat cheese slices    Tuna, salmon, crab, egg, or chicken salad made with light or fat free mayonnaise    Egg or Egg Substitute    Lean/extra lean turkey, beef, bison, venison (ground, sirloin, round, flank)    Pork loin or tenderloin (grilled, baked, broiled)    Fish such as salmon, tuna, trout, tilapia, etc. (grilled, baked, broiled)    Tender cuts of lean (skinless) turkey or chicken    Lean deli meats: turkey, lean ham, chicken, lean roast beef    Beans such as kidney, garbanzo, black, nugent, or low-fat/fat free refried beans    Peanut butter (natural preferred). Limit to 1 Tbsp. per day.    Low-fat meatloaf (made with lean ground beef or turkey)    Sloppy Joes made with low-sugar ketchup and lean ground beef or turkey    Soy or vegetable protein (i.e. vegan crumbles, soy/veggie burger, tofu)    Hummus    Vegetables:    Fresh: cooked or raw (as tolerated)    Frozen vegetables    Canned vegetables (low sodium or no salt added, rinse before cooking/eating)    (Ok to have skins/peels/membranes/seeds - just chew well)    Fruits:    Fresh fruit    Frozen fruit (no sugar added)    Canned fruit (packed in its own juice, NOT syrup)    (Ok to have skins/peels/membranes/seeds - just chew well)    Starch:    Unsweetened whole-grain hot cereal (or high fiber cold cereal, dry)    Toasted whole wheat bread or [place] Thins    Whole grain crackers    Baked /boiled/mashed potato/sweet potato    Cooked whole grain pasta, brown rice, or other cooked whole  grains    Starchy vegetables: corn, peas, winter squash    Protein Supplement:     Ready to drink protein shake with:  o 15-30 grams protein per serving  o Less than 10 grams total carbohydrate per serving     Protein powder mixed with:  o  Skim or 1% milk  o Low fat or fat free Lactaid milk, plain or no sugar added soymilk  o Water     Fats: (use in moderation)    1 teaspoon of soft tub margarine    1 teaspoon olive oil, canola oil, or peanut oil    1 tablespoon of low-fat kay or salad dressing     Sample Menu for 18+ months after Gastric Bypass    You do NOT need to eat/drink the full portion sizes listed below  Always stop when you are satisfied    Breakfast   cup 1% cottage cheese     cup mixed berries   Lunch 2 oz lean roast beef on   [place] Thin with 1 tsp. light kay    small tomato, chopped, mixed with 1 tsp. light vinaigrette dressing   Supplement Approved protein supplement (if needed between meals)   Dinner 2 oz grilled salmon    cup salad greens with 1 tsp. light salad dressing and 1 tsp. ground flax seed    cup quinoa or brown rice     Breakfast   cup egg substitute with   cup sautéed chopped vegetables  2 light [place] Krisp crackers   Lunch Tuna Melt:   cup tuna mixed with 1 tsp. light kay over   [place] Thin. Top with 2-3 slices cucumber and 1 oz slice of low fat cheese   Supplement 1 cup skim milk (if needed between meals)   Dinner 3 oz  grilled, broiled, or baked seasoned skinless chicken breast    cup asparagus     Breakfast   cup plain oatmeal made with skim or 1% milk with 1 Tbsp. flavored/unflavored protein powder added  1 mozzarella string cheese   Lunch 2 oz deli turkey breast  1/3 cup salad with 1 tsp. light salad dressing, 1/8 of a whole avocado and 1 Tbsp. sunflower seeds   Dinner 3 oz. pork loin made in a crock pot, seasoned with a spice rub    cup cooked carrots   Supplement Approved protein supplement (if needed between meals)     Breakfast 1 cup breakfast casserole made with egg  substitute, turkey sausage,  and steamed, chopped bell peppers   Supplement  1 cup light Greek yogurt (if needed between meals)   Lunch 2 oz. teriyaki turkey    cup mashed sweet potato with 1-2 spritzes of spray butter (like [place])    cup fresh pineapple   Dinner 3 oz low fat meatloaf    cup roasted garlic zucchini     Breakfast   cup leftover breakfast casserole    cup no sugar added applesauce with 1 Tbsp. unflavored protein powder and a sprinkle of cinnamon    Lunch 3 oz shrimp with 1-2 Tbsp. low-sugar cocktail sauce for dipping    c. whole wheat pasta drizzled with   tsp. olive oil   Supplement 1 cup skim/1% milk with scoop of protein powder (if needed between meals)   Dinner Grilled, seasoned kebob with 2 oz lean beef and   cup vegetables     Breakfast Breakfast pizza:   [place] Thin spread with 1 Tbsp. low sugar spaghetti sauce,   cup shredded low fat cheese, melted and 1 slice of [place] kirkpatrick     cup fresh fruit mixed with chopped almonds   Lunch   cup black bean soup  4-5 whole grain crackers   Dinner 3 oz  tilapia with lemon pepper seasoning    cup stewed tomatoes   Supplement 1 string cheese (if needed between meals)     Breakfast 2 hard boiled eggs (discard 1 egg yolk)    whole wheat English Muffin with 1 tsp. low sugar jelly   Lunch   cup leftover black bean soup topped with 1-2 Tbsp. low fat cheese  2-3 light [place] Krisp crackers   Supplement Approved protein supplement (if needed between meals)   Dinner 3 oz sirloin steak    cup steamed broccoli

## 2021-06-02 NOTE — PROGRESS NOTES
Bariatric Care Clinic Follow Up Visit for Previous Bariatric Surgery   Date of visit: 10/29/2019  Physician: Sarai Acosta MD  Primary Care is Vi Bennett MD.  Princess Chang   39 y.o.  female    Date of Surgery: 8/10/2015  Initial Weight: 294 pounds  Initial BMI: 50.4  Today's Weight:   Wt Readings from Last 1 Encounters:   10/29/19 (!) 263 lb (119.3 kg)     Body mass index is 44.45 kg/m .  Weight: (!) 263 lb (119.3 kg)       Assessment and Plan   Assessment: Princess is a 39 y.o. year old female who is 4 years s/p  Joseph en Y Gastric Bypass with Dr. Perdomo.  They have had a durable weight loss of 31 lbs since surgery.  Overall compliance with the [facility] Program has been improved recently.    Princess Chang feels that she has achieved the goal(s) identified pre-operatively but she would like to lose more weight.      Plan:    1. Postoperative malabsorption  Patient is taking all vitamins as directed. 6 months ago her PTH was elevated and vitamin D level was low. This has been rechecked by her primary and D was high. She is now back to taking 5000 IU per day. We will recheck all labs in 6 months.    2. Morbid obesity (H)  Patient was congratulated on her success thus far. Healthy habits to assist with further weight loss were discussed. Written information was given. She will restart the phentermine. Risks, benefits and possible side effects were discussed.     She will follow-up with her dietitian next available and with myself in 3 months          >25 min spent with patient, >50 % spent in counseling and coordination of care     Bariatric Surgery Review   Interim History/LifeChanges: patient had breast reconstruction in April and had terrible complications. It has now finally healed. She started the phentermine 6 months ago and found it was helpful for her. She has not been on it since.    Patient Concerns: weight regain    Hunger 1-10: 5- is only eating between 12:00 and 6:00    GERD  no    Medication changes: no recent    Vitamin Intake:   Multivitamin   2 with iron   Vitamin D  5000 IU, this was at 10,000 IU previously level was high- we do   Calcium  citrate 2 x per day   Vit. B-12    SL     Habits:            Alcohol Intake  none   NSAID Use  none   Caffeine Use  coffee, 1 cup per day   Exercise  some walking on treadmill, 20 minutes most days   CPAP Use:  no   Birth Control  hysterectomy   Tobacco Use     no        Meals: B: noon- 3 eggs  Snack: protein shake  Dinner meat and veggies  Portions: 1 cup                              LABS: vitamin D level done at outside clinic      LABS:  Lab Results   Component Value Date    WBC 7.4 07/16/2015    HGB 13.3 07/16/2015    HCT 40.4 07/16/2015    MCV 93 07/16/2015     07/16/2015      Lab Results   Component Value Date    CSWIRJAH39ZS 16.2 (L) 01/21/2015    Lab Results   Component Value Date    HGBA1C 6.1 01/21/2015      Lab Results   Component Value Date    CHOL 191 01/21/2015    Lab Results   Component Value Date     (H) 01/21/2015         Lab Results   Component Value Date    FERRITIN 45 01/21/2015      Lab Results   Component Value Date    HDL 51 01/21/2015      Lab Results   Component Value Date    FCPQHIRZ72 860 (H) 01/21/2015    Lab Results   Component Value Date    19830 153 01/21/2015      Lab Results   Component Value Date    LDLCALC 106 01/21/2015    Lab Results   Component Value Date    TSH 1.61 01/21/2015    Lab Results   Component Value Date    FOLATE >20.0 01/21/2015      Lab Results   Component Value Date    TRIG 172 (H) 01/21/2015    Lab Results   Component Value Date    ALT 46 (H) 07/16/2015    AST 29 07/16/2015    ALKPHOS 82 07/16/2015    BILITOT 0.2 07/16/2015    No results found for: TESTOSTERONE     No components found for: CHOLHDL Lab Results   Component Value Date    7597 54.9 01/21/2015      @olimpia(vitamin a: 1)@             Patient Profile   Social History     Patient does not qualify to have social  determinant information on file (likely too young).   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        Past Medical History   Past Medical History:   Diagnosis Date     Allergic rhinitis      Anxiety      Breast cancer (H) May 29,2013    inflammatory on Right, stage 3     Breast mass     10 cm right, breast removed     Disease of thyroid gland     hyperparathyroidism     GERD (gastroesophageal reflux disease)     mild     History of anesthesia complications      Insomnia      Iron deficiency anemia 1/28/2015     Joint pain      Lymphedema 2012 started    right arm and right side of chest     Migraines      Obesity      Polycystic ovary syndrome 1997/98    right ovary removed     PONV (postoperative nausea and vomiting)      Urinary incontinence      Vitamin D deficiency      Patient Active Problem List   Diagnosis     Insomnia     Joint pain     Morbid obesity (H)     Personal history of breast cancer     Lymphedema of arm     Postsurgical menopause     Iron deficiency anemia     Hyperparathyroidism , secondary, non-renal (H)     Vitamin D deficiency     Iron deficiency     Hypertriglyceridemia     Morbid obesity with BMI of 40.0-44.9, adult (H)     Dyslipidemia     Zinc deficiency     Postoperative malabsorption     Current Outpatient Medications   Medication Sig Note     acetaminophen (TYLENOL) 325 MG tablet Take 325 mg by mouth. 12/16/2015: Received from: [facility]     ascorbic acid, vitamin C, 500 mg Chew chew tab Chew 500 mg.      busPIRone (BUSPAR) 15 MG tablet 1/2 tab PO BID x 5d, then 2/3 tab Po BID      Ca-D3-mag#11-zinc-cupr-man-bor 600 mg calcium- 800 unit-50 mg Tab Take by mouth. 12/16/2015: Received from: [facility]     cephalexin (KEFLEX) 250 MG capsule Take 250 mg by mouth. As needed for celulitis flair ups      cholecalciferol, vitamin D3, 5,000 unit Tab Take 5,000  "Units by mouth.      cyanocobalamin, vitamin B-12, 1,000 mcg Subl Place 1,000 mcg under the tongue daily.      FLUoxetine (PROZAC) 40 MG capsule Take 40 mg by mouth daily.      hydrOXYzine pamoate (VISTARIL) 25 MG capsule as needed.      LORazepam (ATIVAN) 0.5 MG tablet Take 0.5-1 mg by mouth as needed.      multivitamin with minerals (THERA-M) 9 mg iron-400 mcg Tab tablet Take 2 tablets by mouth daily.       omega-3 fatty acids-fish oil (FISH OIL) 340-1,000 mg cap Take 2 g by mouth daily.      ondansetron (ZOFRAN-ODT) 4 MG disintegrating tablet Place 4 mg under the tongue. 12/16/2015: Received from: [facility]     phentermine (ADIPEX-P) 37.5 mg tablet 1/2 tab every morning. May increase to full tab every morning after 1 week.      traMADol (ULTRAM) 50 mg tablet as needed for migraine.        Past Surgical History  She has a past surgical history that includes Hysterectomy; Mastectomy modified radical; Tonsillectomy; Cholecystectomy open; Breast reconstruction; Simple mastectomy (Left); Appendectomy; Modified radical mastectomy w/ axillary lymph node dissection (Right); and pr lap gastric bypass/jason-en-y (N/A, 8/10/2015).     Examination   Ht 5' 4.5\" (1.638 m)   Wt (!) 263 lb (119.3 kg)   Breastfeeding? No   BMI 44.45 kg/m    Height: 5' 4.5\" (1.638 m) (10/29/2019  3:30 PM)  Weight: (!) 263 lb (119.3 kg) (10/29/2019  3:30 PM)  BMI (Calculated): 44.5 (10/29/2019  3:30 PM)  SpO2: 99 % (3/5/2019 10:15 AM)    General:  Alert and ambulatory,   HEENT:  No conjunctival pallor, moist mucous Membranes, neck is without LAD  Pulmonary:  Normal respiratory effort, no cough, no audible wheezes/crackles.  CV:  Regular rate and Rhythm, no murmurs  Abdominal: Scars well healed, BS normal,soft, NT without rebound or guarding  Extremities: no edeam  Skin:  No rashes   Pscyh/Mood: stable         Counseling:   We reviewed the important post op bariatric recommendations:  -eating 3 meals daily  -eating protein first, " getting >60gm protein daily  -eating slowly, chewing food well  -avoiding/limiting calorie containing beverages  -drinking water 15-30 minutes before or after meals  -choosing wheat, not white with breads, crackers, pastas, gab, bagels, tortillas, rice  -limiting restaurant or cafeteria eating to twice a week or less    We discussed the importance of restorative sleep and stress management in maintaining a healthy weight.  We discussed the National Weight Control Registry healthy weight maintenance strategies and ways to optimize metabolism.  We discussed the importance of physical activity including cardiovascular and strength training in maintaining a healthier weight.  We discussed the importance of life-long vitamin supplementation and life-long  follow-up.    Princess was reminded that, to avoid marginal ulcers she should avoid tobacco at all, alcohol in excess, caffeine in excess, and NSAIDS (unless indicated for cardioprotection or othewise and opposed by a PPI).    HARI Acosta MD  [facility].  10/29/2019  3:32 PM      Much or all of the text in this note was generated through the use of Dragon Dictate voice-to-text software. Errors in spelling or words which seem out of context are unintentional. Sound alike errors, in particular, may have escaped editing.        No images are attached to the encounter.

## 2021-06-03 VITALS
HEART RATE: 90 BPM | SYSTOLIC BLOOD PRESSURE: 106 MMHG | DIASTOLIC BLOOD PRESSURE: 88 MMHG | HEIGHT: 65 IN | OXYGEN SATURATION: 98 % | WEIGHT: 263 LBS | BODY MASS INDEX: 43.82 KG/M2

## 2021-06-08 NOTE — TELEPHONE ENCOUNTER
Would like to start Phentermine, was prescribed several months ago but never picked it up at pharmacy, but would like to try now.   Pharmacy    [phone]  *ok to leave a detailed message

## 2021-06-08 NOTE — TELEPHONE ENCOUNTER
Left a message to have her call us so we can talk about the medication and also have her make an appointment with Dr. Acosta.  Audra Gaspar RN

## 2021-06-24 NOTE — PROGRESS NOTES
3.5 yrs post op lab orders placed for patient and sent to patient via mail in preparation for appointment with  in March.    Roseline Troy RN, [facility]  P [phone]  F [phone]

## 2021-06-24 NOTE — PROGRESS NOTES
[facility]ariatric Care Clinic Follow Up Visit for Previous Bariatric Surgery   Date of visit: 3/5/2019  Physician: Sarai Acosta MD  Primary Care is Vi Bennett MD.  Princess Chang   38 y.o.  female    Date of Surgery: 8/10/15  Initial Weight: 294#  Initial BMI: 50.4  Today's Weight:   Wt Readings from Last 1 Encounters:   03/05/19 (!) 264 lb (119.7 kg)     Body mass index is 44.62 kg/m .  Weight: (!) 264 lb (119.7 kg)       Assessment and Plan   Assessment: Princess is a 38 y.o. year old female who is 3-1/2 years s/p  Joseph en Y Gastric Bypass with Dr. Perdomo.  They have had a durable weight loss of 30 lbs since surgery.  Overall compliance with the [facility] has been fairly poor recently- she has not been seen for 2 /2 years.    Princess Chang feels that she did achieved the goal(s) identified pre-operatively but is now ready to get back on track.      Plan:    1. Postoperative malabsorption  Patient is taking all vitamins as directed    2. Vitamin D deficiency  She has started taking 10,000 IU per day after her recent tests came back low. We will recheck in 3 months    3. Hyperparathyroidism , secondary, non-renal (H)  This should improve with increased vitamin D. We will recheck in 3 months    4. Morbid obesity  We discussed healthy habits to assist with weight loss. She is ready to get back on track. She will start focusing on protein again. She will measure her food. We discussed medication that may assist with weight loss. Phentermine was prescribed. Risks/ benefits and possible side effects were discussed and questions were answered. She took this many years ago and was very successful on it. We could consider doing an upper GI to check on the status of her pouch.         >25 min spent with patient, >50 % spent in counseling and coordination of care     Bariatric Surgery Review   Interim History/LifeChanges: She has struggled with multiple health concerns. Her lymphedema has been more  problematic for her. She has gotten off track.    Patient Concerns: Patient has fallen off track. She is snacking frequently. She is not paying attention to feeling full.    Hunger 1-10: hungry all of the time    GERD no    Medication changes: no recent    Vitamin Intake:   Multivitamin   2 with iron   Vitamin D  5000 per day, increased to twice a day    Calcium  yes   Vit. B-12    yes     Habits:            Alcohol Intake  none   NSAID Use  none   Caffeine Use  3 cups of coffee with sugared creamer   Exercise  walks on treadmill 15 min 2 x per week   CPAP Use:  no   Birth Control  hysterectomy   Tobacco Use     no     Meals: B: bowl of cereal with milk  L: salad or sandwich ( 2 pieces of bread with cheese and lettuce) D: soup with bread, occasionally eggs, salads, veggie dog or veggie burger sometimes with a piece of bread    Snacks- 2 per day: nuts, popcorn, apples with peanut butter    Serving size approx 2 cups per meal                                 LABS: reviewed      LABS:  Lab Results   Component Value Date    WBC 7.4 07/16/2015    HGB 13.3 07/16/2015    HCT 40.4 07/16/2015    MCV 93 07/16/2015     07/16/2015      Lab Results   Component Value Date    FTSECTOI23GA 16.2 (L) 01/21/2015    Lab Results   Component Value Date    HGBA1C 6.1 01/21/2015      Lab Results   Component Value Date    CHOL 191 01/21/2015    Lab Results   Component Value Date     (H) 01/21/2015         Lab Results   Component Value Date    FERRITIN 45 01/21/2015      Lab Results   Component Value Date    HDL 51 01/21/2015      Lab Results   Component Value Date    YHYNLVTQ34 860 (H) 01/21/2015    Lab Results   Component Value Date    12290 153 01/21/2015      Lab Results   Component Value Date    LDLCALC 106 01/21/2015    Lab Results   Component Value Date    TSH 1.61 01/21/2015    Lab Results   Component Value Date    FOLATE >20.0 01/21/2015      Lab Results   Component Value Date    TRIG 172 (H) 01/21/2015    Lab Results    Component Value Date    ALT 46 (H) 07/16/2015    AST 29 07/16/2015    ALKPHOS 82 07/16/2015    BILITOT 0.2 07/16/2015    No results found for: TESTOSTERONE     No components found for: CHOLHDL Lab Results   Component Value Date    7597 54.9 01/21/2015      @olimpia(vitamin a: 1)@             Patient Profile   Social History     Social History Narrative     9 years with 2 children ages 3 and 2    Reside in [facility] with  they own    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        Past Medical History   Past Medical History:   Diagnosis Date     Allergic rhinitis      Anxiety      Breast cancer (H) May 29,2013    inflammatory on Right, stage 3     Breast mass     10 cm right, breast removed     Disease of thyroid gland     hyperparathyroidism     GERD (gastroesophageal reflux disease)     mild     History of anesthesia complications      Insomnia      Iron deficiency anemia 1/28/2015     Joint pain      Lymphedema 2012 started    right arm and right side of chest     Migraines      Obesity      Polycystic ovary syndrome 1997/98    right ovary removed     PONV (postoperative nausea and vomiting)      Urinary incontinence      Vitamin D deficiency      Patient Active Problem List   Diagnosis     Insomnia     Joint pain     Morbid obesity (H)     Personal history of breast cancer     Lymphedema of arm     Postsurgical menopause     Iron deficiency anemia     Hyperparathyroidism , secondary, non-renal (H)     Vitamin D deficiency     Iron deficiency     Hypertriglyceridemia     Morbid obesity with BMI of 40.0-44.9, adult (H)     Dyslipidemia     Zinc deficiency     Postoperative malabsorption     Current Outpatient Medications   Medication Sig Note     acetaminophen (TYLENOL) 325 MG tablet Take 325 mg by mouth. 12/16/2015: Received from: [facility]     ascorbic acid, vitamin C, 500 mg Chew chew tab Chew 500 mg.      busPIRone (BUSPAR) 15 MG tablet 1/2 tab PO BID x 5d, then 2/3 tab Po BID       "Ca-D3-mag#11-zinc-cupr-man-dioni 600 mg calcium- 800 unit-50 mg Tab Take by mouth. 12/16/2015: Received from: [facility]     cephalexin (KEFLEX) 250 MG capsule Take 250 mg by mouth. As needed for celulitis flair ups      cholecalciferol, vitamin D3, 5,000 unit Tab Take 5,000 Units by mouth.      cyanocobalamin, vitamin B-12, 1,000 mcg Subl Place 1,000 mcg under the tongue daily.      FLUoxetine (PROZAC) 40 MG capsule Take 40 mg by mouth daily.      LORazepam (ATIVAN) 0.5 MG tablet Take 0.5-1 mg by mouth as needed.      multivitamin with minerals (THERA-M) 9 mg iron-400 mcg Tab tablet Take 2 tablets by mouth daily.       omega-3 fatty acids-fish oil (FISH OIL) 340-1,000 mg cap Take 2 g by mouth daily.      ondansetron (ZOFRAN-ODT) 4 MG disintegrating tablet Place 4 mg under the tongue. 12/16/2015: Received from: [facility]     hydrOXYzine pamoate (VISTARIL) 25 MG capsule as needed.      traMADol (ULTRAM) 50 mg tablet as needed for migraine.        Past Surgical History  She has a past surgical history that includes Hysterectomy; Mastectomy modified radical; Tonsillectomy; Cholecystectomy open; Breast reconstruction; Simple mastectomy (Left); Appendectomy; Modified radical mastectomy w/ axillary lymph node dissection (Right); and pr lap gastric bypass/jason-en-y (N/A, 8/10/2015).     Examination   /88 (Patient Site: Left Arm, Patient Position: Sitting, Cuff Size: Adult Large)   Pulse 83   Ht 5' 4.5\" (1.638 m)   Wt (!) 264 lb (119.7 kg)   SpO2 99%   Breastfeeding? No   BMI 44.62 kg/m    Height: 5' 4.5\" (1.638 m) (3/5/2019 10:15 AM)  Weight: (!) 264 lb (119.7 kg) (3/5/2019 10:15 AM)  BMI (Calculated): 44.6 (3/5/2019 10:15 AM)  SpO2: 99 % (3/5/2019 10:15 AM)    General:  Alert and ambulatory,   HEENT:  No conjunctival pallor, moist mucous Membranes.  Skin:  No rahses  Pscyh/Mood: stable         Counseling:   We reviewed the important post op bariatric recommendations:  -eating 3 meals " daily  -eating protein first, getting >60gm protein daily  -eating slowly, chewing food well  -avoiding/limiting calorie containing beverages  -drinking water 15-30 minutes before or after meals  -choosing wheat, not white with breads, crackers, pastas, gab, bagels, tortillas, rice  -limiting restaurant or cafeteria eating to twice a week or less    We discussed the importance of restorative sleep and stress management in maintaining a healthy weight.  We discussed the National Weight Control Registry healthy weight maintenance strategies and ways to optimize metabolism.  We discussed the importance of physical activity including cardiovascular and strength training in maintaining a healthier weight.  We discussed the importance of life-long vitamin supplementation and life-long  follow-up.    Princess was reminded that, to avoid marginal ulcers she should avoid tobacco at all, alcohol in excess, caffeine in excess, and NSAIDS (unless indicated for cardioprotection or othewise and opposed by a PPI).    HARI Acosta MD  [facility].  3/5/2019  10:01 AM      Much or all of the text in this note was generated through the use of Dragon Dictate voice-to-text software. Errors in spelling or words which seem out of context are unintentional. Sound alike errors, in particular, may have escaped editing.        No images are attached to the encounter.

## 2021-06-24 NOTE — PATIENT INSTRUCTIONS - HE
[facility] Bariatric Care  Nutritional Guidelines  Gastric Bypass 18 Months Post Op and Beyond    General Guidelines and Helpful Hints:    Eat 3 meals per day + protein supplement(s). No snacks between meals.  o Do not skip meals.  This can cause overeating at the next meal and will prevent adequate protein and nutritional intake.    Aim for 60-80 grams of protein per day.  o Always eat your protein first. This assists with optimal nutrition and helps you stay full longer.  o Depending on your portion size, you may need to drink approved protein supplement between meals to achieve protein goals. Follow recommendations of your Dietitian.     Eat your protein first, and then follow with fiber.   o It is not necessary to count your fiber, but 15-20 grams per day is recommended.    o Add fiber by including fruits, vegetables, whole grains, and beans.     Portions should remain about 1 cup per meal. Use measuring cups to be accurate.    Continue to use saucer/salad plates, infant/toddler silverware to keep portion sizes small and take small bites.    Eat S-L-O-W-L-Y to make each meal last 20-30 minutes. Always stop eating when satisfied.    Continue to use caution with foods containing skins, peels or membranes. Chew well!    Aim for 64 oz. of calorie-free fluids daily.  o Continue to avoid caffeine and carbonation. If you choose to drink alcohol, do so in moderation.   o Remember to avoid drinking during meals, 15-30 minutes before and 30 minutes after.    Exercise is cano for continued weight loss and weight maintenance. Aim for 30-60 minutes of physical activity most days of the week. Include cardiovascular and strength training.    If having trouble tolerating meat, try using a crock-pot, tinfoil tent, steamer or other moist cooking method to create tender meats. Add broth or low-fat gravy to help meat stay moist.     Avoid high sugar and high fat foods to prevent dumping syndrome.  o Check nutrition labels for less  than 10 grams of sugar and less than 10 grams of fat per serving.    Continue Taking Vitamins/Minerals:  o 9882-2351 mcg of Sublingual B-12 daily  o 1 Multivitamin with Iron twice daily (chewable or swallow tabs)  o 500-600 mg Calcium Citrate twice daily (chewable or swallow tabs)  o 5000 IU Vitamin D3 daily    Sample Grocery List    Protein:    Fat free Greek or light yogurt (less than 10 grams sugar)    Fat free or low-fat cottage cheese    String cheese or reduced fat cheese slices    Tuna, salmon, crab, egg, or chicken salad made with light or fat free mayonnaise    Egg or Egg Substitute    Lean/extra lean turkey, beef, bison, venison (ground, sirloin, round, flank)    Pork loin or tenderloin (grilled, baked, broiled)    Fish such as salmon, tuna, trout, tilapia, etc. (grilled, baked, broiled)    Tender cuts of lean (skinless) turkey or chicken    Lean deli meats: turkey, lean ham, chicken, lean roast beef    Beans such as kidney, garbanzo, black, nguent, or low-fat/fat free refried beans    Peanut butter (natural preferred). Limit to 1 Tbsp. per day.    Low-fat meatloaf (made with lean ground beef or turkey)    Sloppy Joes made with low-sugar ketchup and lean ground beef or turkey    Soy or vegetable protein (i.e. vegan crumbles, soy/veggie burger, tofu)    Hummus    Vegetables:    Fresh: cooked or raw (as tolerated)    Frozen vegetables    Canned vegetables (low sodium or no salt added, rinse before cooking/eating)    (Ok to have skins/peels/membranes/seeds - just chew well)    Fruits:    Fresh fruit    Frozen fruit (no sugar added)    Canned fruit (packed in its own juice, NOT syrup)    (Ok to have skins/peels/membranes/seeds - just chew well)    Starch:    Unsweetened whole-grain hot cereal (or high fiber cold cereal, dry)    Toasted whole wheat bread or [place] Thins    Whole grain crackers    Baked /boiled/mashed potato/sweet potato    Cooked whole grain pasta, brown rice, or other cooked whole  grains    Starchy vegetables: corn, peas, winter squash    Protein Supplement:     Ready to drink protein shake with:  o 15-30 grams protein per serving  o Less than 10 grams total carbohydrate per serving     Protein powder mixed with:  o  Skim or 1% milk  o Low fat or fat free Lactaid milk, plain or no sugar added soymilk  o Water     Fats: (use in moderation)    1 teaspoon of soft tub margarine    1 teaspoon olive oil, canola oil, or peanut oil    1 tablespoon of low-fat kay or salad dressing     Sample Menu for 18+ months after Gastric Bypass    You do NOT need to eat/drink the full portion sizes listed below  Always stop when you are satisfied    Breakfast   cup 1% cottage cheese     cup mixed berries   Lunch 2 oz lean roast beef on   [place] Thin with 1 tsp. light kay    small tomato, chopped, mixed with 1 tsp. light vinaigrette dressing   Supplement Approved protein supplement (if needed between meals)   Dinner 2 oz grilled salmon    cup salad greens with 1 tsp. light salad dressing and 1 tsp. ground flax seed    cup quinoa or brown rice     Breakfast   cup egg substitute with   cup sautéed chopped vegetables  2 light [place] Krisp crackers   Lunch Tuna Melt:   cup tuna mixed with 1 tsp. light kay over   [place] Thin. Top with 2-3 slices cucumber and 1 oz slice of low fat cheese   Supplement 1 cup skim milk (if needed between meals)   Dinner 3 oz  grilled, broiled, or baked seasoned skinless chicken breast    cup asparagus     Breakfast   cup plain oatmeal made with skim or 1% milk with 1 Tbsp. flavored/unflavored protein powder added  1 mozzarella string cheese   Lunch 2 oz deli turkey breast  1/3 cup salad with 1 tsp. light salad dressing, 1/8 of a whole avocado and 1 Tbsp. sunflower seeds   Dinner 3 oz. pork loin made in a crock pot, seasoned with a spice rub    cup cooked carrots   Supplement Approved protein supplement (if needed between meals)     Breakfast 1 cup breakfast casserole made with egg  substitute, turkey sausage,  and steamed, chopped bell peppers   Supplement  1 cup light Greek yogurt (if needed between meals)   Lunch 2 oz. teriyaki turkey    cup mashed sweet potato with 1-2 spritzes of spray butter (like [place])    cup fresh pineapple   Dinner 3 oz low fat meatloaf    cup roasted garlic zucchini     Breakfast   cup leftover breakfast casserole    cup no sugar added applesauce with 1 Tbsp. unflavored protein powder and a sprinkle of cinnamon    Lunch 3 oz shrimp with 1-2 Tbsp. low-sugar cocktail sauce for dipping    c. whole wheat pasta drizzled with   tsp. olive oil   Supplement 1 cup skim/1% milk with scoop of protein powder (if needed between meals)   Dinner Grilled, seasoned kebob with 2 oz lean beef and   cup vegetables     Breakfast Breakfast pizza:   [place] Thin spread with 1 Tbsp. low sugar spaghetti sauce,   cup shredded low fat cheese, melted and 1 slice of [place] kirkpatrick     cup fresh fruit mixed with chopped almonds   Lunch   cup black bean soup  4-5 whole grain crackers   Dinner 3 oz  tilapia with lemon pepper seasoning    cup stewed tomatoes   Supplement 1 string cheese (if needed between meals)     Breakfast 2 hard boiled eggs (discard 1 egg yolk)    whole wheat English Muffin with 1 tsp. low sugar jelly   Lunch   cup leftover black bean soup topped with 1-2 Tbsp. low fat cheese  2-3 light [place] Krisp crackers   Supplement Approved protein supplement (if needed between meals)   Dinner 3 oz sirloin steak    cup steamed broccoli

## 2021-09-25 ENCOUNTER — HEALTH MAINTENANCE LETTER (OUTPATIENT)
Age: 41
End: 2021-09-25

## 2022-01-15 ENCOUNTER — HEALTH MAINTENANCE LETTER (OUTPATIENT)
Age: 42
End: 2022-01-15

## 2022-08-28 ENCOUNTER — HOSPITAL ENCOUNTER (EMERGENCY)
Facility: CLINIC | Age: 42
Discharge: HOME | End: 2022-08-28
Attending: NURSE PRACTITIONER | Admitting: NURSE PRACTITIONER
Payer: COMMERCIAL

## 2022-08-28 VITALS
OXYGEN SATURATION: 99 % | SYSTOLIC BLOOD PRESSURE: 137 MMHG | TEMPERATURE: 97 F | DIASTOLIC BLOOD PRESSURE: 90 MMHG | HEART RATE: 79 BPM

## 2022-08-28 DIAGNOSIS — H57.89 IRRITATION OF RIGHT EYE: ICD-10-CM

## 2022-08-28 PROCEDURE — G0463 HOSPITAL OUTPT CLINIC VISIT: HCPCS | Performed by: NURSE PRACTITIONER

## 2022-08-28 PROCEDURE — 99203 OFFICE O/P NEW LOW 30 MIN: CPT | Performed by: NURSE PRACTITIONER

## 2022-08-28 RX ORDER — MULTIPLE VITAMINS W/ MINERALS TAB 9MG-400MCG
2 TAB ORAL DAILY
COMMUNITY

## 2022-08-28 ASSESSMENT — ACTIVITIES OF DAILY LIVING (ADL): ADLS_ACUITY_SCORE: 35

## 2022-08-28 ASSESSMENT — ENCOUNTER SYMPTOMS
EYE PAIN: 1
EYE DISCHARGE: 0
PHOTOPHOBIA: 0
EYE REDNESS: 1
EYE ITCHING: 0

## 2022-08-28 ASSESSMENT — VISUAL ACUITY
OU: 1
OD: 20/20;WITH CORRECTIVE LENSES
OS: 20/20;WITH CORRECTIVE LENSES

## 2022-08-29 NOTE — ED PROVIDER NOTES
History     Chief Complaint   Patient presents with     Eye Problem     Patient presents today with chemical from  pod in eye. Pt reports her vision is hazy. Symptoms started today . Arrived to urgent care ambulatory.       HPI  Princess Chang is a 42 year old female who presents to the urgent care for evaluation of right eye pain. Prior to arrival patient got  from a  pod in her eye. She immediately removed her contact and flushed her eye. Eye feels irritated and a 'burning' sensation. No vision changes. No eye movement pain.     Allergies:  Allergies   Allergen Reactions     Amoxicillin      Levaquin [Levofloxacin Hemihydrate] Hives     Pcn [Penicillins]      Septra [Bactrim]      Zithromax [Azithromycin Dihydrate]        Problem List:    Patient Active Problem List    Diagnosis Date Noted     Encounter for supervision of other normal pregnancy 12/29/2011     Priority: Medium     Diagnosis updated by automated process. Provider to review and confirm.          Past Medical History:    Past Medical History:   Diagnosis Date     Chickenpox      HPV (human papilloma virus) infection 2002     Varicosities        Past Surgical History:    Past Surgical History:   Procedure Laterality Date     APPENDECTOMY  age 15     APPENDECTOMY       CHOLECYSTECTOMY OPEN       HYSTERECTOMY      at risk     LAPAROSCOPIC OOPHORECTOMY  age 15     MASTECTOMY MODIFIED RADICAL      Right     MASTECTOMY SIMPLE Left      MODIFIED RADICAL MASTECTOMY W/ AXILLARY LYMPH NODE DISSECTION Right      WA LAP GASTRIC BYPASS/KRISTEN-EN-Y N/A 8/10/2015    Procedure: LAPAROSCOPIC KRISTEN-EN-Y GASTRIC BYPASS ;  Surgeon: Thai Perdomo MD;  Location: [facility];  Service: General     REVISE RECONSTRUCTED BREAST      twice     TONSILLECTOMY       TONSILLECTOMY & ADENOIDECTOMY  age 25       Family History:    Family History   Problem Relation Age of Onset     Congenital Anomalies Son         tracheomalacia- surgery at 5 weeks      Blood Disease Mother         anemia     Cancer - colorectal Paternal Grandfather      Breast Cancer Maternal Grandmother      Diabetes Paternal Grandmother      Cancer Maternal Grandfather         skin     Cancer Paternal Grandmother         ovarian     No Known Problems Mother      Obesity Father      Arthritis Father      Brain Cancer Sister         Tumor only, non ca     Seizure Disorder Sister         related to brain tumor     Cerebrovascular Disease Maternal Grandfather      Hypertension Maternal Grandmother      Obesity Paternal Grandfather      Obesity Paternal Grandmother      Breast Cancer Paternal Aunt      Obesity Paternal Aunt        Social History:  Marital Status:   [2]  Social History     Tobacco Use     Smoking status: Never Smoker     Smokeless tobacco: Never Used   Substance Use Topics     Alcohol use: No     Drug use: No        Medications:    FLUoxetine (PROZAC) 20 MG capsule  Acetaminophen (TYLENOL PO)  Aspirin-Acetaminophen-Caffeine (EXCEDRIN MIGRAINE PO)  DimenhyDRINATE (DRAMAMINE PO)  diphenhydrAMINE (BENADRYL) 25 MG capsule  docusate sodium (COLACE) 50 MG capsule  doxylamine (UNISOM) 25 MG TABS  fish oil-omega-3 fatty acids (FISH OIL) 1000 MG capsule  multivitamin w/minerals (MULTIVITAMIN, THERAPEUTIC WITH MINERALS) tablet  Ondansetron (ZOFRAN ODT PO)  order for DME  oxyCODONE-acetaminophen (PERCOCET) 5-325 MG per tablet  SUMAtriptan (IMITREX) 50 MG tablet          Review of Systems   Eyes: Positive for pain and redness. Negative for photophobia, discharge, itching and visual disturbance.   All other systems reviewed and are negative.      Physical Exam   BP: (!) 137/90  Pulse: 79  Temp: 97  F (36.1  C)  SpO2: 99 %      Physical Exam  Constitutional:       General: She is not in acute distress.     Appearance: Normal appearance.   Eyes:      General: Lids are normal. Lids are everted, no foreign bodies appreciated. Vision grossly intact. Gaze aligned appropriately.       Extraocular Movements: Extraocular movements intact.      Conjunctiva/sclera:      Right eye: Right conjunctiva is injected. No chemosis, exudate or hemorrhage.     Pupils: Pupils are equal, round, and reactive to light.      Right eye: No fluorescein uptake.      Comments: Eye pH 7.0   Cardiovascular:      Rate and Rhythm: Normal rate.   Pulmonary:      Effort: Pulmonary effort is normal.   Musculoskeletal:         General: Normal range of motion.      Cervical back: Normal range of motion.   Skin:     General: Skin is warm.      Capillary Refill: Capillary refill takes less than 2 seconds.   Neurological:      General: No focal deficit present.      Mental Status: She is alert.       ED Course                 Procedures    No results found for this or any previous visit (from the past 24 hour(s)).    Medications - No data to display    Assessments & Plan (with Medical Decision Making)   Princess Chang is a 42 year old female who presents to the urgent care for evaluation of right eye pain. Prior to arrival patient got  from a  pod in her eye. She immediately removed her contact and flushed her eye. Eye feels irritated and a 'burning' sensation. Slightly hypertensive, remaining vitals normal. Exam as above. No change in vision acuity. Eye pH 7.0. no fluoresceine uptake. Eyes flushed at eye wash station, slight improvement in symptoms. Plan to follow up with [facility] or return with new or worsening symptoms. Discharged in good condition.     I have reviewed the nursing notes.    I have reviewed the findings, diagnosis, plan and need for follow up with the patient.      Discharge Medication List as of 8/28/2022  7:28 PM          Final diagnoses:   Irritation of right eye       8/28/2022   [facility] EMERGENCY DEPT     Kassandra Kidd, HARVEY CNP  08/28/22 2002

## 2022-12-26 ENCOUNTER — HEALTH MAINTENANCE LETTER (OUTPATIENT)
Age: 42
End: 2022-12-26

## 2024-02-04 ENCOUNTER — HEALTH MAINTENANCE LETTER (OUTPATIENT)
Age: 44
End: 2024-02-04

## 2024-04-16 ENCOUNTER — TRANSCRIBE ORDERS (OUTPATIENT)
Dept: OTHER | Age: 44
End: 2024-04-16

## 2024-04-16 ENCOUNTER — PATIENT OUTREACH (OUTPATIENT)
Dept: ONCOLOGY | Facility: CLINIC | Age: 44
End: 2024-04-16
Payer: COMMERCIAL

## 2024-04-16 DIAGNOSIS — C50.919 BREAST CANCER (H): Primary | ICD-10-CM

## 2024-04-16 NOTE — PROGRESS NOTES
New Patient Oncology Nurse Navigator Note     Referring provider: Self     Referring Clinic/Organization: Patient is following Dr. Louise from his practice at [facility]     Referred to (specialty:) Medical Oncology     Requested provider (if applicable): Dr. 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     She underwent neoadjuvant chemotherapy (Adriamycin and Cytoxan completed 7/27/2012. Weekly Taxol and Herceptin completed 11/23/2012).     Followed by a right modified radical mastectomy and a left prophylactic simple mastectomy 12/13/2012 by Dr. Bundy and immediate reconstruction with Dr. Helton. Pathology at the time of her surgery showed no residual invasive tumor and all 10 lymph nodes were negative but with treatment effect. A solitary minute focus of lymphovascular invasion was seen. Her tumor was estrogen and progesterone receptor negative, HER-2 positive.     She completed a year of Herceptin therapy.     She completed postmastectomy radiation therapy.     She has undergone KATHY/BSO.     Her gene testing was negative.     She saw Dr. Sammie Bundy on 8/25/2016 and an area of redness was noted over the right chest wall and a punch biopsy was performed. Pathology demonstrated a mild perivascular lymphocytic infiltrate. Negative for malignancy.     The patient reported having fat grafting approximately 7-8 months in 2018. The grafting was to the right chest wall reconstruction and completed by Dr. Helton. She subsequently developed a palpable mass on the right chest wall that was biopsy-proven fat necrosis.    She met with Dr. Kasey Gillespie in 2018.    She saw Dr. Helton for fat grafting in 2019. This was complicated by  "infection and failure of the fat grafting to take. She states that she had open wounds and surgical excision of the necrotic fat. Her wounds ultimately had to heal by secondary intention.     Last apparent note from Dr. Louise is 2/26/2019.     2024     3/15/24 - Liver MRI with sonogram FAVIAN Das  [facility] referred her because her enzymes were high        4/16/24 - Right breast ultrasound  EXAM: ULTRASOUND BREAST UNILATERAL RIGHT LIMITED   LOCATION: [facility]   DATE: 4/16/2024   INDICATION: Right breast mass seen on liver MRI performed at an outside institution. She has a history of inflammatory right breast cancer, post bilateral mastectomy and implant reconstruction.   COMPARISON: Correlation is made with liver MRI 03/15/2024: \"Along the lateral aspect of right breast implant, 7 cm ovoid signal intensity is present.\"   ULTRASOUND FINDINGS: Right reconstructed breast ultrasound was performed targeted to the mass seen on MRI. Scanning was performed by both the sonographer and myself. At the 9:00 axis, on the lateral aspect of the implant, there is an oval, heterogeneous, prominently hypoechoic, circumscribed mass measuring 7.6 x 4.5 x 4.6 cm. On ultrasound images, it is difficult to see the margin of the implant. On further review of prior liver MRI (03/15/2024), this mass appears to be compressing the lateral aspect of the implant (rather than arising from within the implant) and probably arises from the fibrous tissue capsule. Recommend ultrasound-guided biopsy. We discussed the risk of implant rupture.     She'll see Dr. Helton regarding the probable rupture from biopsy and he has a schedule     4/24 - Biopsy [facility].   Pathology report   Pathology review (as applicable)    4/17 1113 - Telephoned and spoke with patient. Explained my role and purpose for the call. Writer will inform Dr. Louise of biopsy date and current concerns and identify plan for consult. Princess has my " contact information and calls welcomed.    4/17 4762 - Telephoned and spoke with Princess. Dr. Louise will see her on 4/26. He is confident we can expect the [facility] path by then. Writer received referral, reviewed for appropriate plan, and call warm transferred to New Patient Scheduling for completion.    Records Location: Care Everywhere, Media, and See Bookmarked material     Records Needed:   Breast imaging for past 5 years  All imaging in 2024  [facility] consult and progress notes  3/15/24 Liver MRI imaging and reports ([place], [place])  Kevin Helton (plastics) consult and progress notes  4/24/24 right breast biopsy pathology report and path review (as applicable)  Genetic counseling consult and progress notes  Genetic testing report

## 2024-04-24 NOTE — PROGRESS NOTES
[facility] Medical Oncology Note  Date of visit: April 26, 2024  New Outpatient Clinic Note        Assessment:     New 7-1/2 cm abnormality adherent to the implant in a patient who is status post mastectomy axillary lymph node dissection and postmastectomy radiotherapy for a HER2 amplified breast cancer to which she had a pathologic complete response after neoadjuvant chemotherapy 11-1/2 years ago.  The pathology shows an entity with which I was previously unfamiliar.  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  This is an area that is had significant iatrogenic insult so it certainly makes sense that the entire mass is composed of this.  However I think it really makes sense to go ahead and resect the entire thing because we certainly do not want to miss an invasive malignancy.  In this setting, the differential diagnosis is an angiosarcoma versus an anaplastic large cell lymphoma versus a true breast cancer relapse.  All of these are extraordinarily rare events but we do not want to miss them.  While her liver MRI was reassuring for no obvious abdominal metastatic disease, I do want to get a CT/PET scan just to make sure there is nothing else that we are missing here.  After this though I do think the plan should be to follow-up with her breast surgeon as well as Dr. Hernandez.  I think it would be interesting to explore TRAM flap reconstruction for her since the skin there is already quite damaged from the prior interventions.  She was very young at the time of her diagnosis but recent genetic testing still is negative.  She is status post maximal prophylactic surgery including bilateral mastectomy hysterectomy and bilateral salpingo-oophorectomy.  Otherwise healthy and dynamic person.  What a delight it is to see Kellen again.        Plan:     Follow-up with her surgeons for complete resection of the mass as well as  likely removal of the implant.  If the pathology remains benign, then and no longer needs to follow-up with me.  Clearly if there is surprised malignancy, will need to see her ASAP to figure out the next steps.  I do think it is reasonable to get a CT/PET scan as well just to make sure there is no other obvious areas of hypermetabolic activity concerning for malignancy.          Ricco Louise MD, MSc  Associate Professor of Medicine  [employer]  [facility] [place] [address]  [phone]    __________________________________________________________________    DIAGNOSES     Stage 0 ([id number]) poorly differentiated HER2/lorena amplified, ER/UT negative invasive ductal breast cancer, diagnosed definitively at bilateral mastectomy, 12/17/2012.  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  It was diagnosed when and was 26 weeks pregnant.  Pathology report at the time of her surgery (after neoadjuvant treatment) showed no residual invasive cancer in the breast and all 10 lymph nodes were negative, though 2 showed treatment effect.  A solitary minute focus of lymphovascular invasion was seen in a lymphatic duct.   Palpable abnormality in the left breast with ultrasound performed on 56/28/2013 showing a 1.5 cm abnormality.  Ultrasound biopsy showed fatty necrosis with fibrosis and inflammation.      History of Present Illness/therapy to date:     Neoadjuvant Adriamycin and Cytoxan given acute 3-week intervals x 4 6/1 through 7/27/2012  Status post a successful delivery of her child Nitin, 8/18/2012  Weekly paclitaxel and trastuzumab, 8/31 through 11/23/2012  Status post bilateral mastectomy and right sided axillary lymph node dissection 12/13/2012  Trastuzumab monotherapy completed 8/9/2013  Postmastectomy radiotherapy, completed 3/28/2013  Bilateral salpingo-oophorectomy, 11/8/2013  Observation in  the interim.  I last saw and 2/26/2019.      Interval history/Recent developments:     Mary has a fatty liver with elevated LFTs.  MR elastography of the liver performed 3/15/2024 showed hepatic steatosis but a noncirrhotic liver morphology and no focal hepatic lesions.  Upper cuts did show a focal T2 hyperintensity measuring 7.0 x 4.1 cm in size in the right breast implant.  Ultrasound of the right reconstructed breast on 4/16/2024 showed a 7.6 x 4.5 x 4.6 cm circumscribed mass on the lateral aspect of the implant.  Biopsy done 4/24/2024. This has come back showing fibroadipose tissue with dense fibrosis and abundant serous fluid with focal papillary endothelial hyperplasia,  Per the comment: A) Although the histologic findings are consistent with a hematoma with benign papillary endothelial hyperplasia and fibrosis adjacent an implant, given the clinico-radiologic findings and the potential for under-sampling in a biopsy of a large mass, excision of the 76 mm mass is recommended.  This is an image-guided breast biopsy. The pathologic findings should be correlated with radiologic and clinical findings prior to treatment decisions.   She otherwise feels very well.  She has no other new lumps or bumps.  She has no pain.  There is no cough or shortness of breath.          Past Medical History:     Past Medical History:   Diagnosis Date    Chickenpox     HPV (human papilloma virus) infection 2002    Varicosities     legs          Past Surgical History:    I have reviewed this patient's past surgical history       Social History:   Tobacco, ETOH, and rec drugs reviewed and as noted below with the following exceptions:   to Kevin. They have two sons          Family History:     Family History   Problem Relation Age of Onset    Congenital Anomalies Son         tracheomalacia- surgery at 5 weeks    Blood Disease Mother         anemia    Cancer - colorectal Paternal Grandfather     Breast Cancer Maternal Grandmother      Diabetes Paternal Grandmother     Cancer Maternal Grandfather         skin    Cancer Paternal Grandmother         ovarian    No Known Problems Mother     Obesity Father     Arthritis Father     Brain Cancer Sister         Tumor only, non ca    Seizure Disorder Sister         related to brain tumor    Cerebrovascular Disease Maternal Grandfather     Hypertension Maternal Grandmother     Obesity Paternal Grandfather     Obesity Paternal Grandmother     Breast Cancer Paternal Aunt     Obesity Paternal Aunt             Medications:     Current Outpatient Medications   Medication Sig Dispense Refill    Acetaminophen (TYLENOL PO) Take 650 mg by mouth every 4 hours as needed      Aspirin-Acetaminophen-Caffeine (EXCEDRIN MIGRAINE PO) Take 2 tablets by mouth as needed      DimenhyDRINATE (DRAMAMINE PO) Take 2 tablets by mouth as needed      diphenhydrAMINE (BENADRYL) 25 MG capsule Take 25 mg by mouth every 6 hours as needed. Takes 2 every nite      docusate sodium (COLACE) 50 MG capsule Take 100 mg by mouth 2 times daily      doxylamine (UNISOM) 25 MG TABS Take 50 mg by mouth nightly as needed      fish oil-omega-3 fatty acids (FISH OIL) 1000 MG capsule Take 2 g by mouth daily.      FLUoxetine (PROZAC) 20 MG capsule Take 60 mg by mouth      multivitamin w/minerals (MULTIVITAMIN, THERAPEUTIC WITH MINERALS) tablet Take 2 tablets by mouth daily      Ondansetron (ZOFRAN ODT PO) Take 4 mg by mouth every 6 hours as needed      order for DME Equipment being ordered:   custom compression sleeve x2  Tribute nighttime compression garment x1 1 each 0    oxyCODONE-acetaminophen (PERCOCET) 5-325 MG per tablet Take 2 tablets by mouth every 4 hours as needed      SUMAtriptan (IMITREX) 50 MG tablet Take 50 mg by mouth at onset of headache                Physical Exam:   not currently breastfeeding.    ECOG PS: 0  Constitutional: WDWN female in NAD, pleasant and appropriate  HEENT:  NC/AT, no icterus, OP clear, MMM  Skin: No jaundice nor  ecchymoses  Lungs: CTAB, no w/r/r, nonlabored breathing  Cardiovascular: RRR, S1, S2, no m/r/g  Abdomen: +BS, soft, nontender, nondistended, no organomegaly nor masses  MSK/Extremities: Warm, well perfused. No edema  LN: no cervical, supraclavicular, axillary, nor inguinal lymphadenopathy  Neurologic: alert, answering questions appropriately, moving all extremities spontaneously. CN 2-12 grossly intact.  Psych: appropriate affect  Breast exam: The patient is status post bilateral mastectomy.  On the right side she is also status post axillary lymph node dissection as well as postmastectomy radiotherapy.  The skin is thinned, hyperpigmented, but there is really no palpable abnormality in the cutaneous portion of the reconstructed breast.  In the 7 o'clock position where it meets the chest wall, there is a and indiscrete 5 cm prominence without obvious borders.  The right axilla has no obvious other concerning sign of relapse.  There are some minimal lymphedema in the arm.  The left reconstructed breast has no palpable abnormality in the left axilla is negative.  Data:   Biopsy   A) RIGHT BREAST, 9:00, LATERAL, ULTRASOUND-GUIDED CORE BIOPSY:   1. Fibroadipose tissue with dense fibrosis and abundant serous fluid with focal papillary endothelial hyperplasia, see comment   2. Negative for malignancy     No results found for this or any previous visit (from the past 24 hour(s)).    Other Data       I discussed this case with Dr. Nessa Burris of [facility], Dr. Kevin Helton, the plastic surgeon, Dr. Andrew Lopez of our breast oncology program.    Labs, imaging and treatment plan reviewed with patient. All questions answered.        60 minutes spent on the date of the encounter doing chart review, review of outside records, review of test results, interpretation of tests, patient visit, documentation, discussion with other provider(s), and discussion with family

## 2024-04-26 ENCOUNTER — TRANSFERRED RECORDS (OUTPATIENT)
Dept: HEALTH INFORMATION MANAGEMENT | Facility: CLINIC | Age: 44
End: 2024-04-26
Payer: COMMERCIAL

## 2024-04-26 ENCOUNTER — PRE VISIT (OUTPATIENT)
Dept: ONCOLOGY | Facility: CLINIC | Age: 44
End: 2024-04-26
Payer: COMMERCIAL

## 2024-04-26 ENCOUNTER — ONCOLOGY VISIT (OUTPATIENT)
Dept: ONCOLOGY | Facility: CLINIC | Age: 44
End: 2024-04-26
Attending: INTERNAL MEDICINE
Payer: COMMERCIAL

## 2024-04-26 VITALS
OXYGEN SATURATION: 99 % | DIASTOLIC BLOOD PRESSURE: 49 MMHG | SYSTOLIC BLOOD PRESSURE: 76 MMHG | HEART RATE: 92 BPM | BODY MASS INDEX: 43.43 KG/M2 | TEMPERATURE: 98.3 F | RESPIRATION RATE: 16 BRPM | WEIGHT: 260.7 LBS | HEIGHT: 65 IN

## 2024-04-26 DIAGNOSIS — C50.811 MALIGNANT NEOPLASM OF OVERLAPPING SITES OF RIGHT BREAST IN FEMALE, ESTROGEN RECEPTOR NEGATIVE (H): Primary | ICD-10-CM

## 2024-04-26 DIAGNOSIS — Z17.1 MALIGNANT NEOPLASM OF OVERLAPPING SITES OF RIGHT BREAST IN FEMALE, ESTROGEN RECEPTOR NEGATIVE (H): Primary | ICD-10-CM

## 2024-04-26 PROCEDURE — 99213 OFFICE O/P EST LOW 20 MIN: CPT | Performed by: INTERNAL MEDICINE

## 2024-04-26 PROCEDURE — 99205 OFFICE O/P NEW HI 60 MIN: CPT | Performed by: INTERNAL MEDICINE

## 2024-04-26 RX ORDER — PHENTERMINE HYDROCHLORIDE 37.5 MG/1
TABLET ORAL
COMMUNITY

## 2024-04-26 RX ORDER — FLUOXETINE 40 MG/1
CAPSULE ORAL
COMMUNITY
Start: 2024-03-06

## 2024-04-26 RX ORDER — LORAZEPAM 0.5 MG/1
TABLET ORAL
COMMUNITY
Start: 2024-03-22

## 2024-04-26 RX ORDER — TOPIRAMATE 50 MG/1
TABLET, FILM COATED ORAL
COMMUNITY
Start: 2023-05-22

## 2024-04-26 RX ORDER — METFORMIN HCL 500 MG
2000 TABLET, EXTENDED RELEASE 24 HR ORAL
COMMUNITY
Start: 2023-07-20

## 2024-04-26 RX ORDER — CLOBETASOL PROPIONATE 0.5 MG/ML
SOLUTION TOPICAL
COMMUNITY
Start: 2023-03-16

## 2024-04-26 RX ORDER — HYDROCORTISONE 2.5 %
CREAM (GRAM) TOPICAL
COMMUNITY
Start: 2022-10-24

## 2024-04-26 RX ORDER — CRISABOROLE 20 MG/G
OINTMENT TOPICAL
COMMUNITY
Start: 2023-10-11

## 2024-04-26 RX ORDER — HYDROXYZINE PAMOATE 25 MG/1
CAPSULE ORAL
COMMUNITY
Start: 2023-08-11

## 2024-04-26 RX ORDER — KETOROLAC TROMETHAMINE 10 MG/1
10 TABLET, FILM COATED ORAL
COMMUNITY
Start: 2023-10-05

## 2024-04-26 RX ORDER — KETOCONAZOLE 20 MG/ML
SHAMPOO TOPICAL
COMMUNITY
Start: 2023-04-17

## 2024-04-26 RX ORDER — ZOLPIDEM TARTRATE 5 MG/1
TABLET ORAL
COMMUNITY

## 2024-04-26 RX ORDER — DESOXIMETASONE 2.5 MG/G
CREAM TOPICAL
COMMUNITY
Start: 2023-08-14

## 2024-04-26 RX ORDER — CLONIDINE HYDROCHLORIDE 0.1 MG/1
1 TABLET ORAL AT BEDTIME
COMMUNITY
Start: 2023-06-13

## 2024-04-26 ASSESSMENT — PAIN SCALES - GENERAL: PAINLEVEL: NO PAIN (0)

## 2024-04-26 NOTE — LETTER
4/26/2024         RE: Princess Chang  [id number] [place] [address]        Dear Colleague,    Thank you for referring your patient, Princess Chang, to the [facility] CANCER [facility]. Please see a copy of my visit note below.      [facility] Medical Oncology Note  Date of visit: April 26, 2024  New Outpatient Clinic Note        Assessment:     New 7-1/2 cm abnormality adherent to the implant in a patient who is status post mastectomy axillary lymph node dissection and postmastectomy radiotherapy for a HER2 amplified breast cancer to which she had a pathologic complete response after neoadjuvant chemotherapy 11-1/2 years ago.  The pathology shows an entity with which I was previously unfamiliar.  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  This is an area that is had significant iatrogenic insult so it certainly makes sense that the entire mass is composed of this.  However I think it really makes sense to go ahead and resect the entire thing because we certainly do not want to miss an invasive malignancy.  In this setting, the differential diagnosis is an angiosarcoma versus an anaplastic large cell lymphoma versus a true breast cancer relapse.  All of these are extraordinarily rare events but we do not want to miss them.  While her liver MRI was reassuring for no obvious abdominal metastatic disease, I do want to get a CT/PET scan just to make sure there is nothing else that we are missing here.  After this though I do think the plan should be to follow-up with her breast surgeon as well as Dr. Hernandez.  I think it would be interesting to explore TRAM flap reconstruction for her since the skin there is already quite damaged from the prior interventions.  She was very young at the time of her diagnosis but recent genetic testing still is negative.  She is status post maximal prophylactic surgery including  bilateral mastectomy hysterectomy and bilateral salpingo-oophorectomy.  Otherwise healthy and dynamic person.  What a delight it is to see Princess and Kevin again.        Plan:     Follow-up with her surgeons for complete resection of the mass as well as likely removal of the implant.  If the pathology remains benign, then and no longer needs to follow-up with me.  Clearly if there is surprised malignancy, will need to see her ASAP to figure out the next steps.  I do think it is reasonable to get a CT/PET scan as well just to make sure there is no other obvious areas of hypermetabolic activity concerning for malignancy.          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  It was diagnosed when and was 26 weeks pregnant.  Pathology report at the time of her surgery (after neoadjuvant treatment) showed no residual invasive cancer in the breast and all 10 lymph nodes were negative, though 2 showed treatment effect.  A solitary minute focus of lymphovascular invasion was seen in a lymphatic duct.   Palpable abnormality in the left breast with ultrasound performed on 56/28/2013 showing a 1.5 cm abnormality.  Ultrasound biopsy showed fatty necrosis with fibrosis and inflammation.      History of Present Illness/therapy to date:     Neoadjuvant Adriamycin and Cytoxan given acute 3-week intervals x 4 6/1 through 7/27/2012  Status post a successful delivery of her child Nitin, 8/18/2012  Weekly paclitaxel and trastuzumab, 8/31 through  11/23/2012  Status post bilateral mastectomy and right sided axillary lymph node dissection 12/13/2012  Trastuzumab monotherapy completed 8/9/2013  Postmastectomy radiotherapy, completed 3/28/2013  Bilateral salpingo-oophorectomy, 11/8/2013  Observation in the interim.  I last saw and 2/26/2019.      Interval history/Recent developments:     Mary has a fatty liver with elevated LFTs.  MR elastography of the liver performed 3/15/2024 showed hepatic steatosis but a noncirrhotic liver morphology and no focal hepatic lesions.  Upper cuts did show a focal T2 hyperintensity measuring 7.0 x 4.1 cm in size in the right breast implant.  Ultrasound of the right reconstructed breast on 4/16/2024 showed a 7.6 x 4.5 x 4.6 cm circumscribed mass on the lateral aspect of the implant.  Biopsy done 4/24/2024. This has come back showing fibroadipose tissue with dense fibrosis and abundant serous fluid with focal papillary endothelial hyperplasia,  Per the comment: A) Although the histologic findings are consistent with a hematoma with benign papillary endothelial hyperplasia and fibrosis adjacent an implant, given the clinico-radiologic findings and the potential for under-sampling in a biopsy of a large mass, excision of the 76 mm mass is recommended.  This is an image-guided breast biopsy. The pathologic findings should be correlated with radiologic and clinical findings prior to treatment decisions.   She otherwise feels very well.  She has no other new lumps or bumps.  She has no pain.  There is no cough or shortness of breath.          Past Medical History:     Past Medical History:   Diagnosis Date    Chickenpox     HPV (human papilloma virus) infection 2002    Varicosities     legs          Past Surgical History:    I have reviewed this patient's past surgical history       Social History:   Tobacco, ETOH, and rec drugs reviewed and as noted below with the following exceptions:   to Kevin. They have two  sons          Family History:     Family History   Problem Relation Age of Onset    Congenital Anomalies Son         tracheomalacia- surgery at 5 weeks    Blood Disease Mother         anemia    Cancer - colorectal Paternal Grandfather     Breast Cancer Maternal Grandmother     Diabetes Paternal Grandmother     Cancer Maternal Grandfather         skin    Cancer Paternal Grandmother         ovarian    No Known Problems Mother     Obesity Father     Arthritis Father     Brain Cancer Sister         Tumor only, non ca    Seizure Disorder Sister         related to brain tumor    Cerebrovascular Disease Maternal Grandfather     Hypertension Maternal Grandmother     Obesity Paternal Grandfather     Obesity Paternal Grandmother     Breast Cancer Paternal Aunt     Obesity Paternal Aunt             Medications:     Current Outpatient Medications   Medication Sig Dispense Refill    Acetaminophen (TYLENOL PO) Take 650 mg by mouth every 4 hours as needed      Aspirin-Acetaminophen-Caffeine (EXCEDRIN MIGRAINE PO) Take 2 tablets by mouth as needed      DimenhyDRINATE (DRAMAMINE PO) Take 2 tablets by mouth as needed      diphenhydrAMINE (BENADRYL) 25 MG capsule Take 25 mg by mouth every 6 hours as needed. Takes 2 every nite      docusate sodium (COLACE) 50 MG capsule Take 100 mg by mouth 2 times daily      doxylamine (UNISOM) 25 MG TABS Take 50 mg by mouth nightly as needed      fish oil-omega-3 fatty acids (FISH OIL) 1000 MG capsule Take 2 g by mouth daily.      FLUoxetine (PROZAC) 20 MG capsule Take 60 mg by mouth      multivitamin w/minerals (MULTIVITAMIN, THERAPEUTIC WITH MINERALS) tablet Take 2 tablets by mouth daily      Ondansetron (ZOFRAN ODT PO) Take 4 mg by mouth every 6 hours as needed      order for DME Equipment being ordered:   custom compression sleeve x2  Tribute nighttime compression garment x1 1 each 0    oxyCODONE-acetaminophen (PERCOCET) 5-325 MG per tablet Take 2 tablets by mouth every 4 hours as needed       SUMAtriptan (IMITREX) 50 MG tablet Take 50 mg by mouth at onset of headache                Physical Exam:   not currently breastfeeding.    ECOG PS: 0  Constitutional: WDWN female in NAD, pleasant and appropriate  HEENT:  NC/AT, no icterus, OP clear, MMM  Skin: No jaundice nor ecchymoses  Lungs: CTAB, no w/r/r, nonlabored breathing  Cardiovascular: RRR, S1, S2, no m/r/g  Abdomen: +BS, soft, nontender, nondistended, no organomegaly nor masses  MSK/Extremities: Warm, well perfused. No edema  LN: no cervical, supraclavicular, axillary, nor inguinal lymphadenopathy  Neurologic: alert, answering questions appropriately, moving all extremities spontaneously. CN 2-12 grossly intact.  Psych: appropriate affect  Breast exam: The patient is status post bilateral mastectomy.  On the right side she is also status post axillary lymph node dissection as well as postmastectomy radiotherapy.  The skin is thinned, hyperpigmented, but there is really no palpable abnormality in the cutaneous portion of the reconstructed breast.  In the 7 o'clock position where it meets the chest wall, there is a and indiscrete 5 cm prominence without obvious borders.  The right axilla has no obvious other concerning sign of relapse.  There are some minimal lymphedema in the arm.  The left reconstructed breast has no palpable abnormality in the left axilla is negative.  Data:   Biopsy   A) RIGHT BREAST, 9:00, LATERAL, ULTRASOUND-GUIDED CORE BIOPSY:   1. Fibroadipose tissue with dense fibrosis and abundant serous fluid with focal papillary endothelial hyperplasia, see comment   2. Negative for malignancy     No results found for this or any previous visit (from the past 24 hour(s)).    Other Data       I discussed this case with Dr. Nessa Burris of [facility] pathology, Dr. Kevin Helton, the plastic surgeon, Dr. Andrew Lopez of our breast oncology program.    Labs, imaging and treatment plan reviewed with patient. All questions  answered.        60 minutes spent on the date of the encounter doing chart review, review of outside records, review of test results, interpretation of tests, patient visit, documentation, discussion with other provider(s), and discussion with family

## 2024-04-26 NOTE — NURSING NOTE
"Oncology Rooming Note    April 26, 2024 11:38 AM   Princess Chang is a 43 year old female who presents for:    Chief Complaint   Patient presents with    Oncology Clinic Visit     New Onc Pt with Breast Cancer     Initial Vitals: BP (!) 76/49 (BP Location: Right arm, Patient Position: Sitting, Cuff Size: Adult Large)   Pulse 92   Temp 98.3  F (36.8  C) (Oral)   Resp 16   Ht 1.647 m (5' 4.86\")   Wt 118.3 kg (260 lb 11.2 oz)   SpO2 99%   BMI 43.57 kg/m   Estimated body mass index is 43.57 kg/m  as calculated from the following:    Height as of this encounter: 1.647 m (5' 4.86\").    Weight as of this encounter: 118.3 kg (260 lb 11.2 oz). Body surface area is 2.33 meters squared.  No Pain (0) Comment: Data Unavailable   No LMP recorded.  Allergies reviewed: Yes  Medications reviewed: Yes    Medications: Medication refills not needed today.  Pharmacy name entered into [employer]: [id number] - [place] [id number]    Frailty Screening:   Is the patient here for a new oncology consult visit in cancer care? 2. No      Clinical concerns: None      Reji Gillespie"

## 2024-05-08 DIAGNOSIS — Z17.1 MALIGNANT NEOPLASM OF OVERLAPPING SITES OF RIGHT BREAST IN FEMALE, ESTROGEN RECEPTOR NEGATIVE (H): Primary | ICD-10-CM

## 2024-05-08 DIAGNOSIS — C50.811 MALIGNANT NEOPLASM OF OVERLAPPING SITES OF RIGHT BREAST IN FEMALE, ESTROGEN RECEPTOR NEGATIVE (H): Primary | ICD-10-CM

## 2024-05-08 RX ORDER — LORAZEPAM 1 MG/1
1 TABLET ORAL EVERY 6 HOURS PRN
Qty: 10 TABLET | Refills: 0 | Status: SHIPPED | OUTPATIENT
Start: 2024-05-08

## 2024-05-13 ENCOUNTER — HOSPITAL ENCOUNTER (OUTPATIENT)
Dept: PET IMAGING | Facility: HOSPITAL | Age: 44
Discharge: HOME | End: 2024-05-13
Attending: INTERNAL MEDICINE | Admitting: INTERNAL MEDICINE
Payer: COMMERCIAL

## 2024-05-13 DIAGNOSIS — Z17.1 MALIGNANT NEOPLASM OF OVERLAPPING SITES OF RIGHT BREAST IN FEMALE, ESTROGEN RECEPTOR NEGATIVE (H): ICD-10-CM

## 2024-05-13 DIAGNOSIS — C50.811 MALIGNANT NEOPLASM OF OVERLAPPING SITES OF RIGHT BREAST IN FEMALE, ESTROGEN RECEPTOR NEGATIVE (H): ICD-10-CM

## 2024-05-13 LAB — GLUCOSE BLDC GLUCOMTR-MCNC: 95 MG/DL (ref 70–99)

## 2024-05-13 PROCEDURE — 82962 GLUCOSE BLOOD TEST: CPT

## 2024-05-13 PROCEDURE — 78815 PET IMAGE W/CT SKULL-THIGH: CPT | Mod: MG,PI

## 2024-05-13 PROCEDURE — A9552 F18 FDG: HCPCS | Performed by: INTERNAL MEDICINE

## 2024-05-13 PROCEDURE — 343N000001 HC RX 343: Performed by: INTERNAL MEDICINE

## 2024-05-13 RX ORDER — FLUDEOXYGLUCOSE F 18 200 MCI/ML
7-18 INJECTION, SOLUTION INTRAVENOUS ONCE
Status: COMPLETED | OUTPATIENT
Start: 2024-05-13 | End: 2024-05-13

## 2024-05-13 RX ADMIN — FLUDEOXYGLUCOSE F 18 15.51 MILLICURIE: 200 INJECTION, SOLUTION INTRAVENOUS at 08:46

## 2025-03-02 ENCOUNTER — HEALTH MAINTENANCE LETTER (OUTPATIENT)
Age: 45
End: 2025-03-02